# Patient Record
Sex: FEMALE | Race: WHITE | NOT HISPANIC OR LATINO | Employment: UNEMPLOYED | URBAN - METROPOLITAN AREA
[De-identification: names, ages, dates, MRNs, and addresses within clinical notes are randomized per-mention and may not be internally consistent; named-entity substitution may affect disease eponyms.]

---

## 2022-09-22 ENCOUNTER — OFFICE VISIT (OUTPATIENT)
Dept: FAMILY MEDICINE CLINIC | Facility: CLINIC | Age: 37
End: 2022-09-22
Payer: COMMERCIAL

## 2022-09-22 VITALS
SYSTOLIC BLOOD PRESSURE: 118 MMHG | HEART RATE: 91 BPM | WEIGHT: 142 LBS | DIASTOLIC BLOOD PRESSURE: 80 MMHG | WEIGHT: 142 LBS | HEART RATE: 91 BPM | SYSTOLIC BLOOD PRESSURE: 118 MMHG | DIASTOLIC BLOOD PRESSURE: 80 MMHG | BODY MASS INDEX: 24.24 KG/M2 | TEMPERATURE: 96.8 F | HEIGHT: 64 IN | BODY MASS INDEX: 24.24 KG/M2 | OXYGEN SATURATION: 98 % | OXYGEN SATURATION: 98 % | TEMPERATURE: 96.8 F | HEIGHT: 64 IN

## 2022-09-22 DIAGNOSIS — L65.9 HAIR LOSS: ICD-10-CM

## 2022-09-22 DIAGNOSIS — R51.9 CHRONIC INTRACTABLE HEADACHE, UNSPECIFIED HEADACHE TYPE: ICD-10-CM

## 2022-09-22 DIAGNOSIS — N91.2 AMENORRHEA: Primary | ICD-10-CM

## 2022-09-22 DIAGNOSIS — G89.29 CHRONIC INTRACTABLE HEADACHE, UNSPECIFIED HEADACHE TYPE: ICD-10-CM

## 2022-09-22 LAB
SL AMB POCT URINE HCG: NEGATIVE
SL AMB POCT URINE HCG: NEGATIVE

## 2022-09-22 PROCEDURE — 81025 URINE PREGNANCY TEST: CPT | Performed by: STUDENT IN AN ORGANIZED HEALTH CARE EDUCATION/TRAINING PROGRAM

## 2022-09-22 PROCEDURE — 99204 OFFICE O/P NEW MOD 45 MIN: CPT | Performed by: STUDENT IN AN ORGANIZED HEALTH CARE EDUCATION/TRAINING PROGRAM

## 2022-09-22 PROCEDURE — 3725F SCREEN DEPRESSION PERFORMED: CPT | Performed by: STUDENT IN AN ORGANIZED HEALTH CARE EDUCATION/TRAINING PROGRAM

## 2022-09-22 RX ORDER — AMITRIPTYLINE HYDROCHLORIDE 10 MG/1
10 TABLET, FILM COATED ORAL
Qty: 90 TABLET | Refills: 0 | Status: SHIPPED | OUTPATIENT
Start: 2022-09-22

## 2022-09-22 RX ORDER — NORETHINDRONE ACETATE AND ETHINYL ESTRADIOL 1MG-20(21)
1 KIT ORAL DAILY
Qty: 84 TABLET | Refills: 3 | Status: SHIPPED | OUTPATIENT
Start: 2022-09-22

## 2022-09-22 NOTE — PROGRESS NOTES
Assessment/Plan:         Problem List Items Addressed This Visit    None     Visit Diagnoses     Amenorrhea    -  Primary    Relevant Medications    norethindrone-ethinyl estradiol (Loestrin Fe 1/20) 1-20 MG-MCG per tablet    Other Relevant Orders    POCT urine HCG (Completed)    TSH, 3rd generation with Free T4 reflex    Comprehensive metabolic panel    CBC and differential    Magnesium    Vitamin D 25 hydroxy    Chronic intractable headache, unspecified headache type        Relevant Medications    amitriptyline (ELAVIL) 10 mg tablet    Other Relevant Orders    TSH, 3rd generation with Free T4 reflex    Comprehensive metabolic panel    CBC and differential    Magnesium    Vitamin D 25 hydroxy    Hair loss            Will start her on oral birth control to help with amenorrhea  Will treat with the TCA for the daily tension-type headaches  If no relief can try Topamax  Subjective:      Patient ID: Valente Babb is a 40 y o  female  HPI    1 sided headaches  Mostly R sided but can occur on the left side  Stress or burden of working/thinking can make it worse  Occurs daily  Uses advil and this offers moderate to significant relief  Started in college (15+ years ago)  They started to occur daily for the last few months  Having irregular periods since she was 13years old  Was on birth control  Without birth control she goes 3 months at a time  Also concerned about losing hear  Very stressed      The following portions of the patient's history were reviewed and updated as appropriate:   Past Medical History:  She has no past medical history on file ,  _______________________________________________________________________  Medical Problems:  does not have a problem list on file ,  _______________________________________________________________________  Past Surgical History:   has no past surgical history on file ,  _______________________________________________________________________  [facility] History:  family history includes Diabetes in her father and mother ,  _______________________________________________________________________  Social History:   reports that she has never smoked  She has never used smokeless tobacco  She reports that she does not drink alcohol and does not use drugs  ,  _______________________________________________________________________  Allergies:  is allergic to seasonal ic [cholestatin]     _______________________________________________________________________  Current Outpatient Medications   Medication Sig Dispense Refill    amitriptyline (ELAVIL) 10 mg tablet Take 1 tablet (10 mg total) by mouth daily at bedtime 90 tablet 0    norethindrone-ethinyl estradiol (Loestrin Fe 1/20) 1-20 MG-MCG per tablet Take 1 tablet by mouth daily 84 tablet 3     No current facility-administered medications for this visit      _______________________________________________________________________  Review of Systems   Constitutional: Negative for chills, fatigue and fever  HENT: Negative for rhinorrhea and sore throat  Eyes: Negative for visual disturbance  Respiratory: Negative for cough and shortness of breath  Cardiovascular: Negative for chest pain and palpitations  Gastrointestinal: Negative for abdominal pain, constipation, diarrhea, nausea and vomiting  Genitourinary: Positive for menstrual problem  Negative for difficulty urinating, dysuria and frequency  Musculoskeletal: Negative for arthralgias and myalgias  Skin: Negative for color change and rash  Neurological: Positive for headaches  Negative for weakness  Objective:  Vitals:    09/22/22 1102   BP: 118/80   Pulse: 91   Temp: (!) 96 8 °F (36 °C)   SpO2: 98%   Weight: 64 4 kg (142 lb)   Height: 5' 4" (1 626 m)     Body mass index is 24 37 kg/m²  Physical Exam  Constitutional:       General: She is not in acute distress  Appearance: She is not ill-appearing     HENT:      Head: Normocephalic and atraumatic  Right Ear: External ear normal       Left Ear: External ear normal       Nose: Nose normal  No congestion or rhinorrhea  Mouth/Throat:      Mouth: Mucous membranes are moist       Pharynx: Oropharynx is clear  No oropharyngeal exudate or posterior oropharyngeal erythema  Eyes:      Extraocular Movements: Extraocular movements intact  Conjunctiva/sclera: Conjunctivae normal       Pupils: Pupils are equal, round, and reactive to light  Cardiovascular:      Rate and Rhythm: Normal rate and regular rhythm  Pulses: Normal pulses  Heart sounds: No murmur heard  Pulmonary:      Effort: Pulmonary effort is normal  No respiratory distress  Breath sounds: Normal breath sounds  No wheezing  Chest:      Chest wall: No tenderness  Abdominal:      General: Bowel sounds are normal       Palpations: Abdomen is soft  Tenderness: There is no abdominal tenderness  Musculoskeletal:         General: Normal range of motion  Cervical back: Normal range of motion  Skin:     General: Skin is warm and dry  Capillary Refill: Capillary refill takes less than 2 seconds  Findings: No rash  Neurological:      General: No focal deficit present  Mental Status: She is alert  Mental status is at baseline

## 2022-10-12 ENCOUNTER — APPOINTMENT (OUTPATIENT)
Dept: LAB | Facility: CLINIC | Age: 37
End: 2022-10-12

## 2022-10-12 DIAGNOSIS — N91.2 AMENORRHEA: ICD-10-CM

## 2022-10-12 DIAGNOSIS — R51.9 CHRONIC INTRACTABLE HEADACHE, UNSPECIFIED HEADACHE TYPE: ICD-10-CM

## 2022-10-12 DIAGNOSIS — G89.29 CHRONIC INTRACTABLE HEADACHE, UNSPECIFIED HEADACHE TYPE: ICD-10-CM

## 2022-10-12 LAB
ALBUMIN SERPL BCP-MCNC: 3.8 G/DL (ref 3.5–5)
ALP SERPL-CCNC: 47 U/L (ref 46–116)
ALT SERPL W P-5'-P-CCNC: 29 U/L (ref 12–78)
ANION GAP SERPL CALCULATED.3IONS-SCNC: 3 MMOL/L (ref 4–13)
AST SERPL W P-5'-P-CCNC: 18 U/L (ref 5–45)
BASOPHILS # BLD AUTO: 0.02 THOUSANDS/ΜL (ref 0–0.1)
BASOPHILS NFR BLD AUTO: 0 % (ref 0–1)
BILIRUB SERPL-MCNC: 0.35 MG/DL (ref 0.2–1)
BUN SERPL-MCNC: 20 MG/DL (ref 5–25)
CALCIUM SERPL-MCNC: 9.2 MG/DL (ref 8.3–10.1)
CHLORIDE SERPL-SCNC: 105 MMOL/L (ref 96–108)
CO2 SERPL-SCNC: 27 MMOL/L (ref 21–32)
CREAT SERPL-MCNC: 0.75 MG/DL (ref 0.6–1.3)
EOSINOPHIL # BLD AUTO: 0.19 THOUSAND/ΜL (ref 0–0.61)
EOSINOPHIL NFR BLD AUTO: 4 % (ref 0–6)
ERYTHROCYTE [DISTWIDTH] IN BLOOD BY AUTOMATED COUNT: 12.5 % (ref 11.6–15.1)
GFR SERPL CREATININE-BSD FRML MDRD: 102 ML/MIN/1.73SQ M
GLUCOSE P FAST SERPL-MCNC: 101 MG/DL (ref 65–99)
HCT VFR BLD AUTO: 41.9 % (ref 34.8–46.1)
HGB BLD-MCNC: 13.2 G/DL (ref 11.5–15.4)
IMM GRANULOCYTES # BLD AUTO: 0 THOUSAND/UL (ref 0–0.2)
IMM GRANULOCYTES NFR BLD AUTO: 0 % (ref 0–2)
LYMPHOCYTES # BLD AUTO: 2.55 THOUSANDS/ΜL (ref 0.6–4.47)
LYMPHOCYTES NFR BLD AUTO: 49 % (ref 14–44)
MAGNESIUM SERPL-MCNC: 2.2 MG/DL (ref 1.6–2.6)
MCH RBC QN AUTO: 29.7 PG (ref 26.8–34.3)
MCHC RBC AUTO-ENTMCNC: 31.5 G/DL (ref 31.4–37.4)
MCV RBC AUTO: 94 FL (ref 82–98)
MONOCYTES # BLD AUTO: 0.33 THOUSAND/ΜL (ref 0.17–1.22)
MONOCYTES NFR BLD AUTO: 6 % (ref 4–12)
NEUTROPHILS # BLD AUTO: 2.11 THOUSANDS/ΜL (ref 1.85–7.62)
NEUTS SEG NFR BLD AUTO: 41 % (ref 43–75)
NRBC BLD AUTO-RTO: 0 /100 WBCS
PMV BLD AUTO: 11.7 FL (ref 8.9–12.7)
POTASSIUM SERPL-SCNC: 4.6 MMOL/L (ref 3.5–5.3)
PROT SERPL-MCNC: 8.5 G/DL (ref 6.4–8.4)
RBC # BLD AUTO: 4.44 MILLION/UL (ref 3.81–5.12)
SODIUM SERPL-SCNC: 135 MMOL/L (ref 135–147)
TSH SERPL DL<=0.05 MIU/L-ACNC: 1.26 UIU/ML (ref 0.45–4.5)
WBC # BLD AUTO: 5.2 THOUSAND/UL (ref 4.31–10.16)

## 2022-10-12 PROCEDURE — 80053 COMPREHEN METABOLIC PANEL: CPT

## 2022-10-12 PROCEDURE — 82306 VITAMIN D 25 HYDROXY: CPT

## 2022-10-12 PROCEDURE — 84443 ASSAY THYROID STIM HORMONE: CPT

## 2022-10-12 PROCEDURE — 83735 ASSAY OF MAGNESIUM: CPT

## 2022-10-12 PROCEDURE — 85025 COMPLETE CBC W/AUTO DIFF WBC: CPT

## 2022-10-12 PROCEDURE — 36415 COLL VENOUS BLD VENIPUNCTURE: CPT

## 2022-10-13 LAB — 25(OH)D3 SERPL-MCNC: 31.7 NG/ML (ref 30–100)

## 2022-11-16 ENCOUNTER — OFFICE VISIT (OUTPATIENT)
Dept: FAMILY MEDICINE CLINIC | Facility: CLINIC | Age: 37
End: 2022-11-16

## 2022-11-16 VITALS
WEIGHT: 159 LBS | HEART RATE: 71 BPM | SYSTOLIC BLOOD PRESSURE: 118 MMHG | TEMPERATURE: 98.1 F | HEIGHT: 64 IN | OXYGEN SATURATION: 96 % | BODY MASS INDEX: 27.14 KG/M2 | DIASTOLIC BLOOD PRESSURE: 76 MMHG

## 2022-11-16 DIAGNOSIS — R51.9 CHRONIC INTRACTABLE HEADACHE, UNSPECIFIED HEADACHE TYPE: ICD-10-CM

## 2022-11-16 DIAGNOSIS — Z00.00 ANNUAL PHYSICAL EXAM: ICD-10-CM

## 2022-11-16 DIAGNOSIS — G89.29 CHRONIC INTRACTABLE HEADACHE, UNSPECIFIED HEADACHE TYPE: ICD-10-CM

## 2022-11-16 DIAGNOSIS — Z23 INFLUENZA VACCINE ADMINISTERED: Primary | ICD-10-CM

## 2022-11-16 DIAGNOSIS — J30.2 SEASONAL ALLERGIES: ICD-10-CM

## 2022-11-16 DIAGNOSIS — N91.2 AMENORRHEA: ICD-10-CM

## 2022-11-16 RX ORDER — NORETHINDRONE ACETATE AND ETHINYL ESTRADIOL 1MG-20(21)
1 KIT ORAL DAILY
Qty: 84 TABLET | Refills: 3 | Status: SHIPPED | OUTPATIENT
Start: 2022-11-16

## 2022-11-16 RX ORDER — TOPIRAMATE 25 MG/1
25 TABLET ORAL 2 TIMES DAILY
Qty: 120 TABLET | Refills: 1 | Status: SHIPPED | OUTPATIENT
Start: 2022-11-16

## 2022-11-16 RX ORDER — CETIRIZINE HYDROCHLORIDE 10 MG/1
10 TABLET, CHEWABLE ORAL DAILY
Qty: 90 TABLET | Refills: 1 | Status: SHIPPED | OUTPATIENT
Start: 2022-11-16

## 2022-11-16 RX ORDER — FLUTICASONE PROPIONATE 50 MCG
1 SPRAY, SUSPENSION (ML) NASAL DAILY
Qty: 9.9 ML | Refills: 1 | Status: SHIPPED | OUTPATIENT
Start: 2022-11-16

## 2022-11-16 NOTE — PROGRESS NOTES
[facility] [address] [facility] [id number]    NAME: Mary Covarrubias  AGE: 40 y o  SEX: female  : 1985     DATE: 2022     Assessment and Plan:     Problem List Items Addressed This Visit    None  Visit Diagnoses     Influenza vaccine administered    -  Primary    Relevant Orders    influenza vaccine, quadrivalent, 0 5 mL, preservative-free, for adult and pediatric patients 6 mos+ (AFLURIA, FLUARIX, FLULAVAL, FLUZONE) (Completed)    Annual physical exam        BMI 27 0-27 9,adult        Seasonal allergies        Relevant Medications    fluticasone (FLONASE) 50 mcg/act nasal spray    cetirizine (ZyrTEC) 10 MG chewable tablet    Chronic intractable headache, unspecified headache type        Relevant Medications    topiramate (Topamax) 25 mg tablet    Amenorrhea        Relevant Medications    norethindrone-ethinyl estradiol (Loestrin Fe ) 1-20 MG-MCG per tablet        If patient and her spouse decide to attempt to conceive a pregnancy when he returns in January, recommend in December after her period to stop her OCP  Also recommended against using Topamax if trying to conceive or if she becomes pregnanct  She will call with any questions     Immunizations and preventive care screenings were discussed with patient today  Appropriate education was printed on patient's after visit summary  Counseling:  Exercise: the importance of regular exercise/physical activity was discussed  Recommend exercise 3-5 times per week for at least 30 minutes  BMI Counseling: Body mass index is 27 29 kg/m²  The BMI is above normal  Nutrition recommendations include decreasing portion sizes, encouraging healthy choices of fruits and vegetables, decreasing fast food intake, consuming healthier snacks, limiting drinks that contain sugar and moderation in carbohydrate intake   Exercise recommendations include moderate physical activity 150 minutes/week, exercising 3-5 times per week and strength training exercises  No pharmacotherapy was ordered  Rationale for BMI follow-up plan is due to patient being overweight or obese  Depression Screening and Follow-up Plan: Patient was screened for depression during today's encounter  They screened negative with a PHQ-2 score of 0  Return in 2 months (on 1/16/2023) for Recheck  Chief Complaint:     Chief Complaint   Patient presents with   • Physical Exam     Pt wants to discuss allergies  Pt states she experiences earache, watery eyes  • Migraine   • Flu Vaccine      History of Present Illness:     Adult Annual Physical   Patient here for a comprehensive physical exam  The patient reports problems - HA'snot improving with TCA  also seasonal allergies  Diet and Physical Activity  Diet/Nutrition: well balanced diet  Exercise: no formal exercise  Depression Screening  PHQ-2/9 Depression Screening    Little interest or pleasure in doing things: 0 - not at all  Feeling down, depressed, or hopeless: 0 - not at all  PHQ-2 Score: 0  PHQ-2 Interpretation: Negative depression screen       General Health  Sleep: sleeps well  Hearing: normal - none   Vision: no vision problems  Dental: regular dental visits  /GYN Health  Last menstrual period: current  Contraceptive method: oral contraceptives, not active  History of STDs?: no      Review of Systems:     Review of Systems   Constitutional: Negative for chills, fatigue and fever  HENT: Negative for rhinorrhea and sore throat  Eyes: Negative for visual disturbance  Respiratory: Negative for cough and shortness of breath  Cardiovascular: Negative for chest pain and palpitations  Gastrointestinal: Negative for abdominal pain, constipation, diarrhea, nausea and vomiting  Genitourinary: Negative for difficulty urinating, dysuria and frequency  Musculoskeletal: Negative for arthralgias and myalgias     Skin: Negative for color change and rash  Neurological: Negative for weakness and headaches  Past Medical History:     History reviewed  No pertinent past medical history  Past Surgical History:     History reviewed  No pertinent surgical history  Social History:     Social History     Socioeconomic History   • Marital status: /Civil Union     Spouse name: None   • Number of children: None   • Years of education: None   • Highest education level: None   Occupational History   • None   Tobacco Use   • Smoking status: Never   • Smokeless tobacco: Never   Vaping Use   • Vaping Use: Never used   Substance and Sexual Activity   • Alcohol use: Never   • Drug use: Never   • Sexual activity: Not Currently   Other Topics Concern   • None   Social History Narrative   • None     Social Determinants of Health     Financial Resource Strain: Not on file   Food Insecurity: Not on file   Transportation Needs: Not on file   Physical Activity: Not on file   Stress: Not on file   Social Connections: Not on file   Intimate Partner Violence: Not on file   Housing Stability: Not on file      Family History:     Family History   Problem Relation Age of Onset   • Diabetes Mother    • Diabetes Father       Current Medications:     Current Outpatient Medications   Medication Sig Dispense Refill   • cetirizine (ZyrTEC) 10 MG chewable tablet Chew 1 tablet (10 mg total) daily 90 tablet 1   • fluticasone (FLONASE) 50 mcg/act nasal spray 1 spray into each nostril daily 9 9 mL 1   • norethindrone-ethinyl estradiol (Loestrin Fe 1/20) 1-20 MG-MCG per tablet Take 1 tablet by mouth daily 84 tablet 3   • topiramate (Topamax) 25 mg tablet Take 1 tablet (25 mg total) by mouth 2 (two) times a day 120 tablet 1     No current facility-administered medications for this visit  Allergies: Allergies   Allergen Reactions   • Seasonal Ic [Cholestatin] Itching     Eyes, nose ,and throat        Physical Exam:     /76 (BP Location: Right arm, Patient Position: Sitting, Cuff Size: Standard)   Pulse 71   Temp 98 1 °F (36 7 °C)   Ht 5' 4" (1 626 m)   Wt 72 1 kg (159 lb)   SpO2 96%   BMI 27 29 kg/m²     Physical Exam  Constitutional:       General: She is not in acute distress  Appearance: Normal appearance  She is not ill-appearing  HENT:      Head: Normocephalic and atraumatic  Right Ear: Tympanic membrane, ear canal and external ear normal       Left Ear: Tympanic membrane, ear canal and external ear normal       Nose: Nose normal       Mouth/Throat:      Mouth: Mucous membranes are moist       Pharynx: Oropharynx is clear  No oropharyngeal exudate or posterior oropharyngeal erythema  Eyes:      General: No scleral icterus  Right eye: No discharge  Left eye: No discharge  Extraocular Movements: Extraocular movements intact  Conjunctiva/sclera: Conjunctivae normal       Pupils: Pupils are equal, round, and reactive to light  Cardiovascular:      Rate and Rhythm: Normal rate and regular rhythm  Pulses: Normal pulses  Heart sounds: Normal heart sounds  No murmur heard  Pulmonary:      Effort: Pulmonary effort is normal  No respiratory distress  Breath sounds: Normal breath sounds  Abdominal:      General: Bowel sounds are normal       Palpations: Abdomen is soft  Tenderness: There is no abdominal tenderness  Musculoskeletal:         General: Normal range of motion  Cervical back: Normal range of motion and neck supple  Lymphadenopathy:      Cervical: No cervical adenopathy  Skin:     General: Skin is warm and dry  Capillary Refill: Capillary refill takes less than 2 seconds  Neurological:      General: No focal deficit present  Mental Status: She is alert and oriented to person, place, and time  Mental status is at baseline  Cranial Nerves: No cranial nerve deficit     Psychiatric:         Mood and Affect: Mood normal           Cande Richards MD   [address] [place] [address] PRACTICE [address]  BMI Counseling: Body mass index is 27 29 kg/m²  The BMI is above normal  Nutrition recommendations include reducing portion sizes, reducing fast food intake, decreasing soda and/or juice intake, increasing intake of lean protein and reducing intake of saturated fat and trans fat  Exercise recommendations include moderate aerobic physical activity for 150 minutes/week and exercising 3-5 times per week

## 2022-11-16 NOTE — PATIENT INSTRUCTIONS
Wellness Visit for Adults   AMBULATORY CARE:   A wellness visit  is when you see your healthcare provider to get screened for health problems  Your healthcare provider will also give you advice on how to stay healthy  Write down your questions so you remember to ask them  Ask your healthcare provider how often you should have a wellness visit  What happens at a wellness visit:  Your healthcare provider will ask about your health, and your family history of health problems  This includes high blood pressure, heart disease, and cancer  He or she will ask if you have symptoms that concern you, if you smoke, and about your mood  You may also be asked about your intake of medicines, supplements, food, and alcohol  Any of the following may be done: Your weight  will be checked  Your height may also be checked so your body mass index (BMI) can be calculated  Your BMI shows if you are at a healthy weight  Your blood pressure  and heart rate will be checked  Your temperature may also be checked  Blood and urine tests  may be done  Blood tests may be done to check your cholesterol levels  Abnormal cholesterol levels increase your risk for heart disease and stroke  You may also need a blood or urine test to check for diabetes if you are at increased risk  Urine tests may be done to look for signs of an infection or kidney disease  A physical exam  includes checking your heartbeat and lungs with a stethoscope  Your healthcare provider may also check your skin to look for sun damage  Screening tests  may be recommended  A screening test is done to check for diseases that may not cause symptoms  The screening tests you may need depend on your age, gender, family history, and lifestyle habits  For example, colorectal screening may be recommended if you are 48years old or older  Screening tests you need if you are a woman:   A Pap smear  is used to screen for cervical cancer   Pap smears are usually done every 3 to 5 years depending on your age  You may need them more often if you have had abnormal Pap smear test results in the past  Ask your healthcare provider how often you should have a Pap smear  A mammogram  is an x-ray of your breasts to screen for breast cancer  Experts recommend mammograms every 2 years starting at age 48 years  You may need a mammogram at age 52 years or younger if you have an increased risk for breast cancer  Talk to your healthcare provider about when you should start having mammograms and how often you need them  Vaccines you may need:   Get an influenza vaccine  every year  The influenza vaccine protects you from the flu  Several types of viruses cause the flu  The viruses change over time, so new vaccines are made each year  Get a tetanus-diphtheria (Td) booster vaccine  every 10 years  This vaccine protects you against tetanus and diphtheria  Tetanus is a severe infection that may cause painful muscle spasms and lockjaw  Diphtheria is a severe bacterial infection that causes a thick covering in the back of your mouth and throat  Get a human papillomavirus (HPV) vaccine  if you are female and aged 23 to 32 or male 23 to 24 and never received it  This vaccine protects you from HPV infection  HPV is the most common infection spread by sexual contact  HPV may also cause vaginal, penile, and anal cancers  Get a pneumococcal vaccine  if you are aged 72 years or older  The pneumococcal vaccine is an injection given to protect you from pneumococcal disease  Pneumococcal disease is an infection caused by pneumococcal bacteria  The infection may cause pneumonia, meningitis, or an ear infection  Get a shingles vaccine  if you are 60 or older, even if you have had shingles before  The shingles vaccine is an injection to protect you from the varicella-zoster virus  This is the same virus that causes chickenpox   Shingles is a painful rash that develops in people who had chickenpox or have been exposed to the virus  How to eat healthy:  My Plate is a model for planning healthy meals  It shows the types and amounts of foods that should go on your plate  Fruits and vegetables make up about half of your plate, and grains and protein make up the other half  A serving of dairy is included on the side of your plate  The amount of calories and serving sizes you need depends on your age, gender, weight, and height  Examples of healthy foods are listed below:  Eat a variety of vegetables  such as dark green, red, and orange vegetables  You can also include canned vegetables low in sodium (salt) and frozen vegetables without added butter or sauces  Eat a variety of fresh fruits , canned fruit in 100% juice, frozen fruit, and dried fruit  Include whole grains  At least half of the grains you eat should be whole grains  Examples include whole-wheat bread, wheat pasta, brown rice, and whole-grain cereals such as oatmeal     Eat a variety of protein foods such as seafood (fish and shellfish), lean meat, and poultry without skin (turkey and chicken)  Examples of lean meats include pork leg, shoulder, or tenderloin, and beef round, sirloin, tenderloin, and extra lean ground beef  Other protein foods include eggs and egg substitutes, beans, peas, soy products, nuts, and seeds  Choose low-fat dairy products such as skim or 1% milk or low-fat yogurt, cheese, and cottage cheese  Limit unhealthy fats  such as butter, hard margarine, and shortening  Exercise:  Exercise at least 30 minutes per day on most days of the week  Some examples of exercise include walking, biking, dancing, and swimming  You can also fit in more physical activity by taking the stairs instead of the elevator or parking farther away from stores  Include muscle strengthening activities 2 days each week  Regular exercise provides many health benefits   It helps you manage your weight, and decreases your risk for type 2 diabetes, heart disease, stroke, and high blood pressure  Exercise can also help improve your mood  Ask your healthcare provider about the best exercise plan for you  General health and safety guidelines:   Do not smoke  Nicotine and other chemicals in cigarettes and cigars can cause lung damage  Ask your healthcare provider for information if you currently smoke and need help to quit  E-cigarettes or smokeless tobacco still contain nicotine  Talk to your healthcare provider before you use these products  Limit alcohol  A drink of alcohol is 12 ounces of beer, 5 ounces of wine, or 1½ ounces of liquor  Lose weight, if needed  Being overweight increases your risk of certain health conditions  These include heart disease, high blood pressure, type 2 diabetes, and certain types of cancer  Protect your skin  Do not sunbathe or use tanning beds  Use sunscreen with a SPF 15 or higher  Apply sunscreen at least 15 minutes before you go outside  Reapply sunscreen every 2 hours  Wear protective clothing, hats, and sunglasses when you are outside  Drive safely  Always wear your seatbelt  Make sure everyone in your car wears a seatbelt  A seatbelt can save your life if you are in an accident  Do not use your cell phone when you are driving  This could distract you and cause an accident  Pull over if you need to make a call or send a text message  Practice safe sex  Use latex condoms if are sexually active and have more than one partner  Your healthcare provider may recommend screening tests for sexually transmitted infections (STIs)  Wear helmets, lifejackets, and protective gear  Always wear a helmet when you ride a bike or motorcycle, go skiing, or play sports that could cause a head injury  Wear protective equipment when you play sports  Wear a lifejacket when you are on a boat or doing water sports      © Copyright [employer] 2022 Information is for End User's use only and may not be sold, redistributed or otherwise used for commercial purposes  All illustrations and images included in CareNotes® are the copyrighted property of A [employer] A M [employer]  or [employer]  The above information is an  only  It is not intended as medical advice for individual conditions or treatments  Talk to your doctor, nurse or pharmacist before following any medical regimen to see if it is safe and effective for you

## 2022-11-18 ENCOUNTER — TELEPHONE (OUTPATIENT)
Dept: FAMILY MEDICINE CLINIC | Facility: CLINIC | Age: 37
End: 2022-11-18

## 2022-11-18 NOTE — TELEPHONE ENCOUNTER
Incoming fax notification via [employer] that drug cetirizine (ZyrTEC) 10 MG chewable tablet  requires prior auth       KEY CODE : [id number]    Proceed with PA?      Please advise

## 2022-11-18 NOTE — TELEPHONE ENCOUNTER
Your PA has been faxed to the plan as a paper copy  Please contact the plan directly if you haven't received a determination in a typical timeframe  You will be notified of the determination via fax

## 2022-11-21 NOTE — TELEPHONE ENCOUNTER
2nd attempt to reach pt- left a detailed vm for pt to pick it up otc  Any questions or concerns pt will call

## 2023-01-24 ENCOUNTER — OFFICE VISIT (OUTPATIENT)
Dept: FAMILY MEDICINE CLINIC | Facility: CLINIC | Age: 38
End: 2023-01-24

## 2023-01-24 VITALS
BODY MASS INDEX: 26.29 KG/M2 | DIASTOLIC BLOOD PRESSURE: 78 MMHG | SYSTOLIC BLOOD PRESSURE: 122 MMHG | TEMPERATURE: 97.6 F | HEIGHT: 64 IN | WEIGHT: 154 LBS | OXYGEN SATURATION: 97 % | HEART RATE: 87 BPM

## 2023-01-24 DIAGNOSIS — Z31.9 DESIRE FOR PREGNANCY: ICD-10-CM

## 2023-01-24 DIAGNOSIS — K21.9 GASTROESOPHAGEAL REFLUX DISEASE WITHOUT ESOPHAGITIS: ICD-10-CM

## 2023-01-24 DIAGNOSIS — K59.00 CONSTIPATION, UNSPECIFIED CONSTIPATION TYPE: ICD-10-CM

## 2023-01-24 DIAGNOSIS — N92.6 IRREGULAR PERIODS: Primary | ICD-10-CM

## 2023-01-24 RX ORDER — CALCIUM CARBONATE 200(500)MG
1 TABLET,CHEWABLE ORAL 2 TIMES DAILY PRN
Qty: 60 TABLET | Refills: 1 | Status: SHIPPED | OUTPATIENT
Start: 2023-01-24

## 2023-01-24 RX ORDER — POLYETHYLENE GLYCOL 3350 17 G/17G
17 POWDER, FOR SOLUTION ORAL DAILY
Qty: 507 G | Refills: 0 | Status: SHIPPED | OUTPATIENT
Start: 2023-01-24

## 2023-01-24 RX ORDER — CHOLECALCIFEROL (VITAMIN D3) 25 MCG
1 TABLET,CHEWABLE ORAL DAILY
Qty: 90 CAPSULE | Refills: 1 | Status: SHIPPED | OUTPATIENT
Start: 2023-01-24

## 2023-01-24 NOTE — PROGRESS NOTES
Assessment/Plan:         Problem List Items Addressed This Visit    None  Visit Diagnoses     Irregular periods    -  Primary    Relevant Orders    Ambulatory Referral to Gynecology    Gastroesophageal reflux disease without esophagitis        Relevant Medications    calcium carbonate (TUMS) 500 mg chewable tablet    Constipation, unspecified constipation type        Relevant Medications    polyethylene glycol (GLYCOLAX) 17 GM/SCOOP powder    Desire for pregnancy        Relevant Medications    Prenatal Vit-Fe Fum-FA-Omega (Prenatal Multi +DHA) 27-0 8-228 MG CAPS        Will have her see GYN as she is going to start to try and concieve when her  is home next month  Prenatal vitamin sent in  abd pain consistenmt with acid reflux, recommend prn tums  Also recommend miralaax daily to help get more regular  Subjective:      Patient ID: Iris Martell is a 41 y o  female  HPI     Patient coming in for follow-up  Reports she is still having irregular periods and has stopped taking the birth control as it was providing some mild relief but she was getting spotting off and on  She is concerned about this as her  is returning next month and they are trying to conceive a child  Has been dealing with epigastric belly pain that is burning in nature  Sometimes radiates towards her back  She has noticed that sometimes meals exacerbated and walking and belching help alleviate the pain  Does not use any NSAIDs and does not drink alcohol  Has also been dealing with constipation  She drinks plenty of water and has a diet higher in vegetables and fruits and states she still has difficulty going to the bathroom  Has 1 bowel movement a day is usually painful has to strain heavily      The following portions of the patient's history were reviewed and updated as appropriate:   Past Medical History:  She has no past medical history on file ,  _______________________________________________________________________  Medical Problems:  does not have a problem list on file ,  _______________________________________________________________________  Past Surgical History:   has no past surgical history on file ,  _______________________________________________________________________  Family History:  family history includes Diabetes in her father and mother ,  _______________________________________________________________________  Social History:   reports that she has never smoked  She has never used smokeless tobacco  She reports that she does not drink alcohol and does not use drugs  ,  _______________________________________________________________________  Allergies:  is allergic to seasonal ic [cholestatin]     _______________________________________________________________________  Current Outpatient Medications   Medication Sig Dispense Refill   • calcium carbonate (TUMS) 500 mg chewable tablet Chew 1 tablet (500 mg total) 2 (two) times a day as needed for indigestion or heartburn 60 tablet 1   • cetirizine (ZyrTEC) 10 MG chewable tablet Chew 1 tablet (10 mg total) daily 90 tablet 1   • fluticasone (FLONASE) 50 mcg/act nasal spray 1 spray into each nostril daily 9 9 mL 1   • polyethylene glycol (GLYCOLAX) 17 GM/SCOOP powder Take 17 g by mouth daily 507 g 0   • Prenatal Vit-Fe Fum-FA-Omega (Prenatal Multi +DHA) 27-0 8-228 MG CAPS Take 1 capsule by mouth in the morning 90 capsule 1   • topiramate (Topamax) 25 mg tablet Take 1 tablet (25 mg total) by mouth 2 (two) times a day 120 tablet 1     No current facility-administered medications for this visit      _______________________________________________________________________  Review of Systems   Constitutional: Negative for activity change, appetite change, fatigue and fever  HENT: Negative for congestion, rhinorrhea and sore throat  Eyes: Negative for visual disturbance     Respiratory: Negative for cough and shortness of breath  Cardiovascular: Negative for chest pain  Gastrointestinal: Positive for abdominal pain and constipation  Negative for nausea and vomiting  Genitourinary: Positive for menstrual problem  Objective:  Vitals:    01/24/23 1559   BP: 122/78   BP Location: Left arm   Patient Position: Sitting   Cuff Size: Standard   Pulse: 87   Temp: 97 6 °F (36 4 °C)   SpO2: 97%   Weight: 69 9 kg (154 lb)   Height: 5' 4" (1 626 m)     Body mass index is 26 43 kg/m²  Physical Exam  Constitutional:       General: She is not in acute distress  Appearance: She is not ill-appearing  HENT:      Head: Normocephalic and atraumatic  Right Ear: External ear normal       Left Ear: External ear normal       Nose: Nose normal  No congestion or rhinorrhea  Mouth/Throat:      Mouth: Mucous membranes are moist       Pharynx: Oropharynx is clear  No oropharyngeal exudate or posterior oropharyngeal erythema  Eyes:      Extraocular Movements: Extraocular movements intact  Conjunctiva/sclera: Conjunctivae normal       Pupils: Pupils are equal, round, and reactive to light  Cardiovascular:      Rate and Rhythm: Normal rate and regular rhythm  Pulses: Normal pulses  Heart sounds: No murmur heard  Pulmonary:      Effort: Pulmonary effort is normal  No respiratory distress  Breath sounds: Normal breath sounds  No wheezing  Chest:      Chest wall: No tenderness  Abdominal:      General: Bowel sounds are normal       Palpations: Abdomen is soft  Tenderness: There is no abdominal tenderness  Musculoskeletal:         General: Normal range of motion  Cervical back: Normal range of motion  Skin:     General: Skin is warm and dry  Capillary Refill: Capillary refill takes less than 2 seconds  Findings: No rash  Neurological:      General: No focal deficit present  Mental Status: She is alert  Mental status is at baseline

## 2023-02-07 ENCOUNTER — TELEPHONE (OUTPATIENT)
Dept: FAMILY MEDICINE CLINIC | Facility: CLINIC | Age: 38
End: 2023-02-07

## 2023-02-07 NOTE — TELEPHONE ENCOUNTER
Pt has cold and would like to be seen with her son - informed pt she cannot be seen at the same time - asked me to ask dr Samuel Rodriguez if she can be seen at [facility] appt

## 2023-02-07 NOTE — TELEPHONE ENCOUNTER
We can schedule her, double book whenever her child's appointment is   Please ask them to come 20-25 minutes early so we may accomodates

## 2023-02-08 ENCOUNTER — OFFICE VISIT (OUTPATIENT)
Dept: FAMILY MEDICINE CLINIC | Facility: CLINIC | Age: 38
End: 2023-02-08

## 2023-02-08 VITALS
OXYGEN SATURATION: 97 % | WEIGHT: 154 LBS | HEART RATE: 75 BPM | HEIGHT: 64 IN | DIASTOLIC BLOOD PRESSURE: 74 MMHG | SYSTOLIC BLOOD PRESSURE: 120 MMHG | TEMPERATURE: 97.8 F | BODY MASS INDEX: 26.29 KG/M2

## 2023-02-08 DIAGNOSIS — J06.9 UPPER RESPIRATORY TRACT INFECTION, UNSPECIFIED TYPE: Primary | ICD-10-CM

## 2023-02-08 NOTE — PROGRESS NOTES
Assessment/Plan:         Problem List Items Addressed This Visit    None  Visit Diagnoses     Upper respiratory tract infection, unspecified type    -  Primary    Relevant Orders    Covid/Flu- Office Collect            Subjective:      Patient ID: Maxi Mills is a 41 y o  female  HPI    Uri symptoms, fatigue  Rhinorrhea  1 week ago started, has overall improved  Took tylenol with relief  Negative for sore throat now, initially was present  Today feels very tired and chills, other symptoms overall resolved    The following portions of the patient's history were reviewed and updated as appropriate:   Past Medical History:  She has no past medical history on file ,  _______________________________________________________________________  Medical Problems:  does not have a problem list on file ,  _______________________________________________________________________  Past Surgical History:   has no past surgical history on file ,  _______________________________________________________________________  Family History:  family history includes Diabetes in her father and mother ,  _______________________________________________________________________  Social History:   reports that she has never smoked  She has never used smokeless tobacco  She reports that she does not drink alcohol and does not use drugs  ,  _______________________________________________________________________  Allergies:  is allergic to seasonal ic [cholestatin]     _______________________________________________________________________  Current Outpatient Medications   Medication Sig Dispense Refill   • calcium carbonate (TUMS) 500 mg chewable tablet Chew 1 tablet (500 mg total) 2 (two) times a day as needed for indigestion or heartburn 60 tablet 1   • cetirizine (ZyrTEC) 10 MG chewable tablet Chew 1 tablet (10 mg total) daily 90 tablet 1   • fluticasone (FLONASE) 50 mcg/act nasal spray 1 spray into each nostril daily 9 9 mL 1   • polyethylene glycol (GLYCOLAX) 17 GM/SCOOP powder Take 17 g by mouth daily 507 g 0   • Prenatal Vit-Fe Fum-FA-Omega (Prenatal Multi +DHA) 27-0 8-228 MG CAPS Take 1 capsule by mouth in the morning 90 capsule 1   • topiramate (Topamax) 25 mg tablet Take 1 tablet (25 mg total) by mouth 2 (two) times a day 120 tablet 1     No current facility-administered medications for this visit      _______________________________________________________________________  Review of Systems   Constitutional: Positive for chills and fatigue  Negative for fever  HENT: Positive for congestion (improving), rhinorrhea (improving), sinus pressure (improving), sinus pain (improving) and sore throat (improving)  Respiratory: Negative for cough  Musculoskeletal: Positive for arthralgias and myalgias  Objective:  Vitals:    02/08/23 0832   BP: 120/74   BP Location: Left arm   Patient Position: Sitting   Cuff Size: Standard   Pulse: 75   Temp: 97 8 °F (36 6 °C)   SpO2: 97%   Weight: 69 9 kg (154 lb)   Height: 5' 4" (1 626 m)     Body mass index is 26 43 kg/m²  Physical Exam  Constitutional:       General: She is not in acute distress  Appearance: She is not ill-appearing  HENT:      Head: Normocephalic and atraumatic  Right Ear: External ear normal       Left Ear: External ear normal       Nose: Nose normal  No congestion or rhinorrhea  Mouth/Throat:      Mouth: Mucous membranes are moist       Pharynx: Oropharynx is clear  No oropharyngeal exudate or posterior oropharyngeal erythema  Eyes:      Extraocular Movements: Extraocular movements intact  Conjunctiva/sclera: Conjunctivae normal       Pupils: Pupils are equal, round, and reactive to light  Cardiovascular:      Rate and Rhythm: Normal rate and regular rhythm  Pulses: Normal pulses  Heart sounds: No murmur heard  Pulmonary:      Effort: Pulmonary effort is normal  No respiratory distress  Breath sounds: Normal breath sounds   No wheezing  Chest:      Chest wall: No tenderness  Abdominal:      General: Bowel sounds are normal       Palpations: Abdomen is soft  Tenderness: There is no abdominal tenderness  Musculoskeletal:         General: Normal range of motion  Cervical back: Normal range of motion  Skin:     General: Skin is warm and dry  Capillary Refill: Capillary refill takes less than 2 seconds  Findings: No rash  Neurological:      General: No focal deficit present  Mental Status: She is alert  Mental status is at baseline

## 2023-02-09 LAB
FLUAV RNA RESP QL NAA+PROBE: NEGATIVE
FLUBV RNA RESP QL NAA+PROBE: NEGATIVE
SARS-COV-2 RNA RESP QL NAA+PROBE: NEGATIVE

## 2023-03-22 DIAGNOSIS — J30.2 SEASONAL ALLERGIES: ICD-10-CM

## 2023-03-22 RX ORDER — FLUTICASONE PROPIONATE 50 MCG
SPRAY, SUSPENSION (ML) NASAL
Qty: 16 ML | Refills: 1 | Status: SHIPPED | OUTPATIENT
Start: 2023-03-22

## 2023-08-14 ENCOUNTER — OFFICE VISIT (OUTPATIENT)
Dept: FAMILY MEDICINE CLINIC | Facility: CLINIC | Age: 38
End: 2023-08-14
Payer: COMMERCIAL

## 2023-08-14 VITALS
SYSTOLIC BLOOD PRESSURE: 124 MMHG | BODY MASS INDEX: 26.8 KG/M2 | DIASTOLIC BLOOD PRESSURE: 70 MMHG | HEIGHT: 64 IN | HEART RATE: 65 BPM | WEIGHT: 157 LBS | TEMPERATURE: 97.1 F | OXYGEN SATURATION: 98 %

## 2023-08-14 DIAGNOSIS — R51.9 CHRONIC INTRACTABLE HEADACHE, UNSPECIFIED HEADACHE TYPE: ICD-10-CM

## 2023-08-14 DIAGNOSIS — J30.2 SEASONAL ALLERGIES: ICD-10-CM

## 2023-08-14 DIAGNOSIS — K59.00 CONSTIPATION, UNSPECIFIED CONSTIPATION TYPE: ICD-10-CM

## 2023-08-14 DIAGNOSIS — G89.29 CHRONIC INTRACTABLE HEADACHE, UNSPECIFIED HEADACHE TYPE: ICD-10-CM

## 2023-08-14 PROCEDURE — 99214 OFFICE O/P EST MOD 30 MIN: CPT | Performed by: STUDENT IN AN ORGANIZED HEALTH CARE EDUCATION/TRAINING PROGRAM

## 2023-08-14 RX ORDER — POLYETHYLENE GLYCOL 3350 17 G/17G
17 POWDER, FOR SOLUTION ORAL DAILY
Qty: 507 G | Refills: 0 | Status: SHIPPED | OUTPATIENT
Start: 2023-08-14

## 2023-08-14 RX ORDER — LORATADINE 10 MG/1
10 TABLET ORAL DAILY
Qty: 90 TABLET | Refills: 1 | Status: SHIPPED | OUTPATIENT
Start: 2023-08-14

## 2023-08-14 RX ORDER — TOPIRAMATE 25 MG/1
25 TABLET ORAL 2 TIMES DAILY
Qty: 120 TABLET | Refills: 1 | Status: CANCELLED | OUTPATIENT
Start: 2023-08-14

## 2023-08-14 RX ORDER — AMITRIPTYLINE HYDROCHLORIDE 25 MG/1
25 TABLET, FILM COATED ORAL
Qty: 90 TABLET | Refills: 0 | Status: SHIPPED | OUTPATIENT
Start: 2023-08-14

## 2023-08-14 RX ORDER — FLUTICASONE PROPIONATE 50 MCG
2 SPRAY, SUSPENSION (ML) NASAL DAILY
Qty: 16 ML | Refills: 1 | Status: SHIPPED | OUTPATIENT
Start: 2023-08-14

## 2023-08-14 NOTE — PROGRESS NOTES
Assessment/Plan:         Problem List Items Addressed This Visit    None  Visit Diagnoses     Chronic intractable headache, unspecified headache type        Relevant Medications    amitriptyline (ELAVIL) 25 mg tablet    Constipation, unspecified constipation type        Relevant Medications    polyethylene glycol (GLYCOLAX) 17 GM/SCOOP powder    Seasonal allergies        Relevant Medications    fluticasone (FLONASE) 50 mcg/act nasal spray    loratadine (CLARITIN) 10 mg tablet        Switch from Topamax to amitriptyline and have follow-up    Subjective:      Patient ID: Rupa Lorenzo is a 41 y.o. female. HPI     Headaches improved on topamax, felt bvery sleep on topamax    Also itching arouind eyes, in mouth. Dealing with allergies. States Zyrtec before is not as helpful. Needs refill on MiraLAX for constipation    The following portions of the patient's history were reviewed and updated as appropriate:   Past Medical History:  She has no past medical history on file.,  _______________________________________________________________________  Medical Problems:  does not have a problem list on file.,  _______________________________________________________________________  Past Surgical History:   has no past surgical history on file.,  _______________________________________________________________________  Family History:  family history includes Diabetes in her father and mother.,  _______________________________________________________________________  Social History:   reports that she has never smoked. She has never used smokeless tobacco. She reports that she does not drink alcohol and does not use drugs. ,  _______________________________________________________________________  Allergies:  is allergic to seasonal ic [cholestatin]. .  _______________________________________________________________________  Current Outpatient Medications   Medication Sig Dispense Refill   • amitriptyline (ELAVIL) 25 mg tablet Take 1 tablet (25 mg total) by mouth daily at bedtime 90 tablet 0   • calcium carbonate (TUMS) 500 mg chewable tablet Chew 1 tablet (500 mg total) 2 (two) times a day as needed for indigestion or heartburn 60 tablet 1   • fluticasone (FLONASE) 50 mcg/act nasal spray 2 sprays into each nostril daily 16 mL 1   • loratadine (CLARITIN) 10 mg tablet Take 1 tablet (10 mg total) by mouth daily 90 tablet 1   • polyethylene glycol (GLYCOLAX) 17 GM/SCOOP powder Take 17 g by mouth daily 507 g 0   • Prenatal Vit-Fe Fum-FA-Omega (Prenatal Multi +DHA) 27-0.8-228 MG CAPS Take 1 capsule by mouth in the morning 90 capsule 1     No current facility-administered medications for this visit.     _______________________________________________________________________  Review of Systems   Constitutional: Negative for activity change, appetite change, fatigue and fever. HENT: Negative for congestion, rhinorrhea and sore throat. Eyes: Negative for visual disturbance. Respiratory: Negative for cough and shortness of breath. Cardiovascular: Negative for chest pain. Gastrointestinal: Positive for constipation. Negative for nausea and vomiting. Neurological: Positive for headaches. Objective:  Vitals:    08/14/23 1007   BP: 124/70   BP Location: Left arm   Patient Position: Sitting   Cuff Size: Standard   Pulse: 65   Temp: (!) 97.1 °F (36.2 °C)   SpO2: 98%   Weight: 71.2 kg (157 lb)   Height: 5' 4" (1.626 m)     Body mass index is 26.95 kg/m². Physical Exam  Constitutional:       General: She is not in acute distress. Appearance: She is well-developed. She is not ill-appearing. HENT:      Head: Normocephalic and atraumatic. Pulmonary:      Effort: Pulmonary effort is normal.   Neurological:      General: No focal deficit present. Mental Status: She is alert.

## 2023-10-30 ENCOUNTER — CLINICAL SUPPORT (OUTPATIENT)
Dept: FAMILY MEDICINE CLINIC | Facility: CLINIC | Age: 38
End: 2023-10-30
Payer: COMMERCIAL

## 2023-10-30 ENCOUNTER — TELEPHONE (OUTPATIENT)
Dept: FAMILY MEDICINE CLINIC | Facility: CLINIC | Age: 38
End: 2023-10-30

## 2023-10-30 DIAGNOSIS — Z23 ENCOUNTER FOR IMMUNIZATION: Primary | ICD-10-CM

## 2023-10-30 PROCEDURE — 90746 HEPB VACCINE 3 DOSE ADULT IM: CPT

## 2023-10-30 PROCEDURE — 90471 IMMUNIZATION ADMIN: CPT

## 2023-10-30 NOTE — TELEPHONE ENCOUNTER
Returned message - spoke w pt - needs HEP B SHOT for immigration ppwrk / forms - will bring forms with - pt went to an u/c and clinic in [place] ([place] unable to get this vaccine. Pt did get her flu shot for 2023 at local [facility]. Pt scheduled a NV for this afternoon and also scheduled PE with you on 11/22/2023 @2:05 PM (due for on 11/16/2023). Pt reminded to bring her complete immunization hx with. Please advise       Pt left a VM re: vaccines  Transcribed VM:  Hello, my name is Dawit. I'm patient of Doctor Ryan Hunter calling so I need Hyper Hyper titis be short. So I want to talk with someone Please call me on this number, [phone]. Thank you so much. Please call me.

## 2023-10-30 NOTE — PROGRESS NOTES
Patient in office for Hep B adult   Patient will have to come back at 2 months , and after 6 months-

## 2023-11-14 DIAGNOSIS — G89.29 CHRONIC INTRACTABLE HEADACHE, UNSPECIFIED HEADACHE TYPE: ICD-10-CM

## 2023-11-14 DIAGNOSIS — R51.9 CHRONIC INTRACTABLE HEADACHE, UNSPECIFIED HEADACHE TYPE: ICD-10-CM

## 2023-11-14 RX ORDER — AMITRIPTYLINE HYDROCHLORIDE 25 MG/1
25 TABLET, FILM COATED ORAL
Qty: 90 TABLET | Refills: 0 | Status: SHIPPED | OUTPATIENT
Start: 2023-11-14

## 2023-12-29 ENCOUNTER — TELEPHONE (OUTPATIENT)
Dept: FAMILY MEDICINE CLINIC | Facility: CLINIC | Age: 38
End: 2023-12-29

## 2023-12-29 NOTE — TELEPHONE ENCOUNTER
"VM from pt:    \"Hi, my name is Dawit. I'm patient of doctor Isael Beltran. So I need to reschedule my appointment. So there was an emergency. That's why I couldn't come. Please call me on this number, [phone]. Thank you.\"    *LVM for pt to call office to r/s or via my chart.  "

## 2024-01-24 ENCOUNTER — OFFICE VISIT (OUTPATIENT)
Dept: FAMILY MEDICINE CLINIC | Facility: CLINIC | Age: 39
End: 2024-01-24
Payer: COMMERCIAL

## 2024-01-24 VITALS
BODY MASS INDEX: 27.83 KG/M2 | HEIGHT: 64 IN | DIASTOLIC BLOOD PRESSURE: 84 MMHG | OXYGEN SATURATION: 68 % | SYSTOLIC BLOOD PRESSURE: 122 MMHG | WEIGHT: 163 LBS | HEART RATE: 68 BPM | TEMPERATURE: 97 F

## 2024-01-24 DIAGNOSIS — G89.29 CHRONIC INTRACTABLE HEADACHE, UNSPECIFIED HEADACHE TYPE: ICD-10-CM

## 2024-01-24 DIAGNOSIS — R51.9 CHRONIC INTRACTABLE HEADACHE, UNSPECIFIED HEADACHE TYPE: ICD-10-CM

## 2024-01-24 DIAGNOSIS — B35.9 TINEA: Primary | ICD-10-CM

## 2024-01-24 DIAGNOSIS — M54.12 CERVICAL RADICULOPATHY: ICD-10-CM

## 2024-01-24 DIAGNOSIS — R10.13 EPIGASTRIC PAIN: ICD-10-CM

## 2024-01-24 PROCEDURE — 99214 OFFICE O/P EST MOD 30 MIN: CPT | Performed by: STUDENT IN AN ORGANIZED HEALTH CARE EDUCATION/TRAINING PROGRAM

## 2024-01-24 RX ORDER — MAGNESIUM GLYCINATE 100 MG
1 CAPSULE ORAL
Qty: 90 CAPSULE | Refills: 1 | Status: SHIPPED | OUTPATIENT
Start: 2024-01-24 | End: 2024-01-24

## 2024-01-24 RX ORDER — PRENATAL VIT 91/IRON/FOLIC/DHA 28-975-200
COMBINATION PACKAGE (EA) ORAL 2 TIMES DAILY
Qty: 15 G | Refills: 1 | Status: SHIPPED | OUTPATIENT
Start: 2024-01-24

## 2024-01-24 RX ORDER — MAGNESIUM GLYCINATE 100 MG
1 CAPSULE ORAL
Qty: 90 CAPSULE | Refills: 1 | Status: SHIPPED | OUTPATIENT
Start: 2024-01-24

## 2024-01-24 RX ORDER — PRENATAL VIT 91/IRON/FOLIC/DHA 28-975-200
COMBINATION PACKAGE (EA) ORAL 2 TIMES DAILY
Qty: 15 G | Refills: 1 | Status: SHIPPED | OUTPATIENT
Start: 2024-01-24 | End: 2024-01-24

## 2024-01-24 NOTE — PROGRESS NOTES
Assessment/Plan:         Problem List Items Addressed This Visit    None  Visit Diagnoses     Tinea    -  Primary    Relevant Medications    terbinafine (LamISIL) 1 % cream    Chronic intractable headache, unspecified headache type        Relevant Medications    Riboflavin 400 MG CAPS    Magnesium Glycinate 665 MG CAPS    Epigastric pain        Cervical radiculopathy            Prophylactic vitamins for the headaches to avoid advil which is likely causing the heart burn. Exercise     Subjective:      Patient ID: Dawit Mckeon is a 39 y.o. female.    Migraine  Associated symptoms include abdominal pain and neck pain. Pertinent negatives include no coughing, fever, nausea, rhinorrhea, sore throat or vomiting.     Foot fungus    Headache: taking advil 2x a day fort he headaches. At times feels heaviness and a hangover type headache. Was on TCA in the past but felt sleepy. At times gets sensitivity to the light. When she is tired the headache is worse, if she gets less sleepy she also gets the headache.    Stomach ache: feels liek a swelling in the epigastric region. Started after the headaches. At times feels heaviness in throat when she feels the stomach pain. Has been burping and at times feels more. Started after taking advil daily    Left upper extrmeity pain and weakness since starting job at [employer]. Improves with exercise.    The following portions of the patient's history were reviewed and updated as appropriate:   Past Medical History:  She has no past medical history on file.,  _______________________________________________________________________  Medical Problems:  does not have a problem list on file.,  _______________________________________________________________________  Past Surgical History:   has no past surgical history on file.,  _______________________________________________________________________  Family History:  family history includes Diabetes in her father and  mother.,  _______________________________________________________________________  Social History:   reports that she has never smoked. She has never used smokeless tobacco. She reports that she does not drink alcohol and does not use drugs.,  _______________________________________________________________________  Allergies:  is allergic to seasonal ic [cholestatin]..  _______________________________________________________________________  Current Outpatient Medications   Medication Sig Dispense Refill   • calcium carbonate (TUMS) 500 mg chewable tablet Chew 1 tablet (500 mg total) 2 (two) times a day as needed for indigestion or heartburn 60 tablet 1   • fluticasone (FLONASE) 50 mcg/act nasal spray 2 sprays into each nostril daily 16 mL 1   • loratadine (CLARITIN) 10 mg tablet Take 1 tablet (10 mg total) by mouth daily 90 tablet 1   • Magnesium Glycinate 665 MG CAPS Take 1 capsule by mouth daily before dinner 90 capsule 1   • polyethylene glycol (GLYCOLAX) 17 GM/SCOOP powder Take 17 g by mouth daily 507 g 0   • Prenatal Vit-Fe Fum-FA-Omega (Prenatal Multi +DHA) 27-0.8-228 MG CAPS Take 1 capsule by mouth in the morning 90 capsule 1   • Riboflavin 400 MG CAPS Take 1 capsule (400 mg total) by mouth daily before dinner 90 capsule 1   • terbinafine (LamISIL) 1 % cream Apply topically 2 (two) times a day 15 g 1     No current facility-administered medications for this visit.     _______________________________________________________________________  Review of Systems   Constitutional:  Negative for activity change, appetite change, fatigue and fever.   HENT:  Negative for congestion, rhinorrhea and sore throat.    Eyes:  Negative for visual disturbance.   Respiratory:  Negative for cough and shortness of breath.    Cardiovascular:  Negative for chest pain.   Gastrointestinal:  Positive for abdominal pain. Negative for nausea and vomiting.   Musculoskeletal:  Positive for neck pain.   Skin:  Positive for rash.  "  Neurological:  Positive for headaches.         Objective:  Vitals:    01/24/24 1320   BP: 122/84   BP Location: Left arm   Patient Position: Sitting   Cuff Size: Standard   Pulse: 68   Temp: (!) 97 °F (36.1 °C)   TempSrc: Tympanic   SpO2: (!) 68%   Weight: 73.9 kg (163 lb)   Height: 5' 4\" (1.626 m)     Body mass index is 27.98 kg/m².     Physical Exam  Constitutional:       General: She is not in acute distress.     Appearance: Normal appearance. She is not ill-appearing.   HENT:      Head: Normocephalic and atraumatic.      Right Ear: External ear normal.      Left Ear: External ear normal.      Nose: Nose normal. No congestion or rhinorrhea.      Mouth/Throat:      Mouth: Mucous membranes are moist.      Pharynx: Oropharynx is clear. No oropharyngeal exudate or posterior oropharyngeal erythema.   Eyes:      Extraocular Movements: Extraocular movements intact.      Conjunctiva/sclera: Conjunctivae normal.      Pupils: Pupils are equal, round, and reactive to light.   Cardiovascular:      Rate and Rhythm: Normal rate and regular rhythm.      Pulses: Normal pulses.      Heart sounds: No murmur heard.  Pulmonary:      Effort: Pulmonary effort is normal. No respiratory distress.      Breath sounds: Normal breath sounds. No wheezing.   Chest:      Chest wall: No tenderness.   Abdominal:      General: Bowel sounds are normal.      Palpations: Abdomen is soft.      Tenderness: There is no abdominal tenderness.   Musculoskeletal:         General: Normal range of motion.      Cervical back: Normal range of motion.   Skin:     General: Skin is warm and dry.      Capillary Refill: Capillary refill takes less than 2 seconds.      Findings: No rash.   Neurological:      General: No focal deficit present.      Mental Status: She is alert. Mental status is at baseline.         "

## 2024-02-21 ENCOUNTER — OFFICE VISIT (OUTPATIENT)
Dept: FAMILY MEDICINE CLINIC | Facility: CLINIC | Age: 39
End: 2024-02-21
Payer: COMMERCIAL

## 2024-02-21 VITALS
WEIGHT: 157 LBS | HEIGHT: 64 IN | HEART RATE: 91 BPM | TEMPERATURE: 98 F | BODY MASS INDEX: 26.8 KG/M2 | SYSTOLIC BLOOD PRESSURE: 130 MMHG | DIASTOLIC BLOOD PRESSURE: 74 MMHG | OXYGEN SATURATION: 98 %

## 2024-02-21 DIAGNOSIS — K59.00 CONSTIPATION, UNSPECIFIED CONSTIPATION TYPE: ICD-10-CM

## 2024-02-21 DIAGNOSIS — K21.9 GASTROESOPHAGEAL REFLUX DISEASE, UNSPECIFIED WHETHER ESOPHAGITIS PRESENT: ICD-10-CM

## 2024-02-21 DIAGNOSIS — Z00.00 ANNUAL PHYSICAL EXAM: Primary | ICD-10-CM

## 2024-02-21 PROCEDURE — 99395 PREV VISIT EST AGE 18-39: CPT | Performed by: STUDENT IN AN ORGANIZED HEALTH CARE EDUCATION/TRAINING PROGRAM

## 2024-02-21 PROCEDURE — 99213 OFFICE O/P EST LOW 20 MIN: CPT | Performed by: STUDENT IN AN ORGANIZED HEALTH CARE EDUCATION/TRAINING PROGRAM

## 2024-02-21 RX ORDER — FAMOTIDINE 20 MG/1
20 TABLET, FILM COATED ORAL
Qty: 90 TABLET | Refills: 1 | Status: SHIPPED | OUTPATIENT
Start: 2024-02-21 | End: 2024-02-21 | Stop reason: SDUPTHER

## 2024-02-21 RX ORDER — FAMOTIDINE 20 MG/1
20 TABLET, FILM COATED ORAL
Qty: 90 TABLET | Refills: 1 | Status: SHIPPED | OUTPATIENT
Start: 2024-02-21 | End: 2025-02-15

## 2024-02-21 NOTE — PROGRESS NOTES
"ADULT ANNUAL PHYSICAL  [facility] [address] [facility]    NAME: Dawit Mckeon  AGE: 39 y.o. SEX: female  : 1985     DATE: 2024     Assessment and Plan:     Problem List Items Addressed This Visit    None  Visit Diagnoses     Annual physical exam    -  Primary    BMI 26.0-26.9,adult        Constipation, unspecified constipation type        Gastroesophageal reflux disease, unspecified whether esophagitis present        Relevant Medications    famotidine (PEPCID) 20 mg tablet            Immunizations and preventive care screenings were discussed with patient today. Appropriate education was printed on patient's after visit summary.    Counseling:  Exercise: the importance of regular exercise/physical activity was discussed. Recommend exercise 3-5 times per week for at least 30 minutes.          Return in 1 year (on 2025) for Annual physical.     Chief Complaint:     Chief Complaint   Patient presents with   • Physical Exam      History of Present Illness:     Adult Annual Physical   Patient here for a comprehensive physical exam. The patient reports problems - see below .    Epigastric pain. Hard to describe, when she feels it her whole bopdy feels \"lazy\". Feels this pain almost everyday. No ETOH or nsaids. No foods have triggered the abdominal pain. If she takes a walk after eating the pain improve. Bowel movements can make the pain improve. Has a bowel movement every day, other days every other day. Mirallaax has helped with both the pain and constip[ation. Occasional burning in epigastric area extedning up as welll    At times feels very sore, works at [employer].    Diet and Physical Activity  Diet/Nutrition: well balanced diet.   Exercise: walking.      Depression Screening  PHQ-2/9 Depression Screening         General Health  Sleep: sleeps well.   Hearing: normal - bilateral.  Vision: no vision problems and goes for regular eye exams. "   Dental: regular dental visits.         Review of Systems:     Review of Systems   Constitutional:  Negative for activity change, appetite change, fatigue and fever.   HENT:  Negative for congestion, rhinorrhea and sore throat.    Eyes:  Negative for visual disturbance.   Respiratory:  Negative for cough and shortness of breath.    Cardiovascular:  Negative for chest pain.   Gastrointestinal:  Positive for abdominal pain. Negative for nausea and vomiting.      Past Medical History:     History reviewed. No pertinent past medical history.   Past Surgical History:     History reviewed. No pertinent surgical history.   Social History:     Social History     Socioeconomic History   • Marital status: /Civil Union     Spouse name: None   • Number of children: None   • Years of education: None   • Highest education level: None   Occupational History   • None   Tobacco Use   • Smoking status: Never   • Smokeless tobacco: Never   Vaping Use   • Vaping status: Never Used   Substance and Sexual Activity   • Alcohol use: Never   • Drug use: Never   • Sexual activity: Not Currently   Other Topics Concern   • None   Social History Narrative   • None     Social Determinants of Health     Financial Resource Strain: Not on file   Food Insecurity: Not on file   Transportation Needs: Not on file   Physical Activity: Not on file   Stress: Not on file   Social Connections: Not on file   Intimate Partner Violence: Not on file   Housing Stability: Not on file      Family History:     Family History   Problem Relation Age of Onset   • Diabetes Mother    • Diabetes Father       Current Medications:     Current Outpatient Medications   Medication Sig Dispense Refill   • famotidine (PEPCID) 20 mg tablet Take 1 tablet (20 mg total) by mouth daily at bedtime 90 tablet 1   • calcium carbonate (TUMS) 500 mg chewable tablet Chew 1 tablet (500 mg total) 2 (two) times a day as needed for indigestion or heartburn 60 tablet 1   • fluticasone  "(FLONASE) 50 mcg/act nasal spray 2 sprays into each nostril daily 16 mL 1   • loratadine (CLARITIN) 10 mg tablet Take 1 tablet (10 mg total) by mouth daily 90 tablet 1   • Magnesium Glycinate 665 MG CAPS Take 1 capsule by mouth daily before dinner 90 capsule 1   • polyethylene glycol (GLYCOLAX) 17 GM/SCOOP powder Take 17 g by mouth daily 507 g 0   • Prenatal Vit-Fe Fum-FA-Omega (Prenatal Multi +DHA) 27-0.8-228 MG CAPS Take 1 capsule by mouth in the morning 90 capsule 1   • Riboflavin 400 MG CAPS Take 1 capsule (400 mg total) by mouth daily before dinner 90 capsule 1   • terbinafine (LamISIL) 1 % cream Apply topically 2 (two) times a day 15 g 1     No current facility-administered medications for this visit.      Allergies:     Allergies   Allergen Reactions   • Seasonal Ic [Cholestatin] Itching     Eyes, nose ,and throat.      Physical Exam:     /74   Pulse 91   Temp 98 °F (36.7 °C)   Ht 5' 4\" (1.626 m)   Wt 71.2 kg (157 lb)   SpO2 98%   BMI 26.95 kg/m²     Physical Exam  Constitutional:       General: She is not in acute distress.     Appearance: Normal appearance. She is not ill-appearing.   HENT:      Head: Normocephalic and atraumatic.      Right Ear: Tympanic membrane, ear canal and external ear normal.      Left Ear: Tympanic membrane, ear canal and external ear normal.      Nose: Nose normal.      Mouth/Throat:      Mouth: Mucous membranes are moist.      Pharynx: Oropharynx is clear. No oropharyngeal exudate or posterior oropharyngeal erythema.   Eyes:      General: No scleral icterus.        Right eye: No discharge.         Left eye: No discharge.      Extraocular Movements: Extraocular movements intact.      Conjunctiva/sclera: Conjunctivae normal.      Pupils: Pupils are equal, round, and reactive to light.   Cardiovascular:      Rate and Rhythm: Normal rate and regular rhythm.      Pulses: Normal pulses.      Heart sounds: Normal heart sounds. No murmur heard.  Pulmonary:      Effort: " Pulmonary effort is normal. No respiratory distress.      Breath sounds: Normal breath sounds.   Abdominal:      General: Bowel sounds are normal.      Palpations: Abdomen is soft.      Tenderness: There is no abdominal tenderness.   Musculoskeletal:         General: Normal range of motion.      Cervical back: Normal range of motion and neck supple.   Lymphadenopathy:      Cervical: No cervical adenopathy.   Skin:     General: Skin is warm and dry.      Capillary Refill: Capillary refill takes less than 2 seconds.   Neurological:      General: No focal deficit present.      Mental Status: She is alert and oriented to person, place, and time. Mental status is at baseline.      Cranial Nerves: No cranial nerve deficit.   Psychiatric:         Mood and Affect: Mood normal.          Celia Beltran MD   [facility] [address] [facility]

## 2024-04-17 ENCOUNTER — APPOINTMENT (EMERGENCY)
Dept: CT IMAGING | Facility: HOSPITAL | Age: 39
End: 2024-04-17

## 2024-04-17 ENCOUNTER — HOSPITAL ENCOUNTER (EMERGENCY)
Facility: HOSPITAL | Age: 39
Discharge: HOME | End: 2024-04-17
Attending: EMERGENCY MEDICINE

## 2024-04-17 VITALS
HEART RATE: 78 BPM | DIASTOLIC BLOOD PRESSURE: 78 MMHG | OXYGEN SATURATION: 99 % | SYSTOLIC BLOOD PRESSURE: 124 MMHG | RESPIRATION RATE: 18 BRPM | TEMPERATURE: 98.1 F

## 2024-04-17 DIAGNOSIS — R51.9 ACUTE NONINTRACTABLE HEADACHE, UNSPECIFIED HEADACHE TYPE: Primary | ICD-10-CM

## 2024-04-17 DIAGNOSIS — R93.0 ABNORMAL CT OF THE HEAD: ICD-10-CM

## 2024-04-17 LAB
ANION GAP SERPL CALCULATED.3IONS-SCNC: 9 MMOL/L (ref 4–13)
BASOPHILS # BLD AUTO: 0.02 THOUSANDS/ÂΜL (ref 0–0.1)
BASOPHILS NFR BLD AUTO: 0 % (ref 0–1)
BUN SERPL-MCNC: 11 MG/DL (ref 5–25)
CALCIUM SERPL-MCNC: 8.9 MG/DL (ref 8.4–10.2)
CHLORIDE SERPL-SCNC: 103 MMOL/L (ref 96–108)
CO2 SERPL-SCNC: 27 MMOL/L (ref 21–32)
CREAT SERPL-MCNC: 0.54 MG/DL (ref 0.6–1.3)
EOSINOPHIL # BLD AUTO: 0.15 THOUSAND/ÂΜL (ref 0–0.61)
EOSINOPHIL NFR BLD AUTO: 2 % (ref 0–6)
ERYTHROCYTE [DISTWIDTH] IN BLOOD BY AUTOMATED COUNT: 12.9 % (ref 11.6–15.1)
GFR SERPL CREATININE-BSD FRML MDRD: 120 ML/MIN/1.73SQ M
GLUCOSE SERPL-MCNC: 86 MG/DL (ref 65–140)
HCG SERPL QL: NEGATIVE
HCT VFR BLD AUTO: 38.1 % (ref 34.8–46.1)
HGB BLD-MCNC: 12.2 G/DL (ref 11.5–15.4)
IMM GRANULOCYTES # BLD AUTO: 0.01 THOUSAND/UL (ref 0–0.2)
IMM GRANULOCYTES NFR BLD AUTO: 0 % (ref 0–2)
LYMPHOCYTES # BLD AUTO: 2.2 THOUSANDS/ÂΜL (ref 0.6–4.47)
LYMPHOCYTES NFR BLD AUTO: 33 % (ref 14–44)
MCH RBC QN AUTO: 29.6 PG (ref 26.8–34.3)
MCHC RBC AUTO-ENTMCNC: 32 G/DL (ref 31.4–37.4)
MCV RBC AUTO: 93 FL (ref 82–98)
MONOCYTES # BLD AUTO: 0.43 THOUSAND/ÂΜL (ref 0.17–1.22)
MONOCYTES NFR BLD AUTO: 7 % (ref 4–12)
NEUTROPHILS # BLD AUTO: 3.78 THOUSANDS/ÂΜL (ref 1.85–7.62)
NEUTS SEG NFR BLD AUTO: 58 % (ref 43–75)
NRBC BLD AUTO-RTO: 0 /100 WBCS
PLATELET # BLD AUTO: 207 THOUSANDS/UL (ref 149–390)
PMV BLD AUTO: 11.7 FL (ref 8.9–12.7)
POTASSIUM SERPL-SCNC: 3.8 MMOL/L (ref 3.5–5.3)
RBC # BLD AUTO: 4.12 MILLION/UL (ref 3.81–5.12)
SODIUM SERPL-SCNC: 139 MMOL/L (ref 135–147)
WBC # BLD AUTO: 6.59 THOUSAND/UL (ref 4.31–10.16)

## 2024-04-17 PROCEDURE — 84703 CHORIONIC GONADOTROPIN ASSAY: CPT | Performed by: EMERGENCY MEDICINE

## 2024-04-17 PROCEDURE — 96365 THER/PROPH/DIAG IV INF INIT: CPT

## 2024-04-17 PROCEDURE — 85025 COMPLETE CBC W/AUTO DIFF WBC: CPT | Performed by: EMERGENCY MEDICINE

## 2024-04-17 PROCEDURE — 70450 CT HEAD/BRAIN W/O DYE: CPT

## 2024-04-17 PROCEDURE — 36415 COLL VENOUS BLD VENIPUNCTURE: CPT | Performed by: EMERGENCY MEDICINE

## 2024-04-17 PROCEDURE — 80048 BASIC METABOLIC PNL TOTAL CA: CPT | Performed by: EMERGENCY MEDICINE

## 2024-04-17 PROCEDURE — 96375 TX/PRO/DX INJ NEW DRUG ADDON: CPT

## 2024-04-17 PROCEDURE — 99284 EMERGENCY DEPT VISIT MOD MDM: CPT | Performed by: EMERGENCY MEDICINE

## 2024-04-17 PROCEDURE — 99285 EMERGENCY DEPT VISIT HI MDM: CPT

## 2024-04-17 RX ORDER — KETOROLAC TROMETHAMINE 30 MG/ML
15 INJECTION, SOLUTION INTRAMUSCULAR; INTRAVENOUS ONCE
Status: COMPLETED | OUTPATIENT
Start: 2024-04-17 | End: 2024-04-17

## 2024-04-17 RX ORDER — MAGNESIUM SULFATE HEPTAHYDRATE 40 MG/ML
2 INJECTION, SOLUTION INTRAVENOUS ONCE
Status: COMPLETED | OUTPATIENT
Start: 2024-04-17 | End: 2024-04-17

## 2024-04-17 RX ORDER — DIPHENHYDRAMINE HYDROCHLORIDE 50 MG/ML
25 INJECTION INTRAMUSCULAR; INTRAVENOUS ONCE
Status: COMPLETED | OUTPATIENT
Start: 2024-04-17 | End: 2024-04-17

## 2024-04-17 RX ORDER — DEXAMETHASONE SODIUM PHOSPHATE 10 MG/ML
10 INJECTION, SOLUTION INTRAMUSCULAR; INTRAVENOUS ONCE
Status: COMPLETED | OUTPATIENT
Start: 2024-04-17 | End: 2024-04-17

## 2024-04-17 RX ORDER — METOCLOPRAMIDE HYDROCHLORIDE 5 MG/ML
10 INJECTION INTRAMUSCULAR; INTRAVENOUS ONCE
Status: COMPLETED | OUTPATIENT
Start: 2024-04-17 | End: 2024-04-17

## 2024-04-17 RX ORDER — ACETAMINOPHEN 325 MG/1
975 TABLET ORAL ONCE
Status: COMPLETED | OUTPATIENT
Start: 2024-04-17 | End: 2024-04-17

## 2024-04-17 RX ADMIN — SODIUM CHLORIDE 1000 ML: 0.9 INJECTION, SOLUTION INTRAVENOUS at 15:32

## 2024-04-17 RX ADMIN — DEXAMETHASONE SODIUM PHOSPHATE 10 MG: 10 INJECTION, SOLUTION INTRAMUSCULAR; INTRAVENOUS at 15:36

## 2024-04-17 RX ADMIN — KETOROLAC TROMETHAMINE 15 MG: 30 INJECTION, SOLUTION INTRAMUSCULAR at 17:18

## 2024-04-17 RX ADMIN — METOCLOPRAMIDE 10 MG: 5 INJECTION, SOLUTION INTRAMUSCULAR; INTRAVENOUS at 15:40

## 2024-04-17 RX ADMIN — ACETAMINOPHEN 975 MG: 325 TABLET, FILM COATED ORAL at 17:18

## 2024-04-17 RX ADMIN — DIPHENHYDRAMINE HYDROCHLORIDE 25 MG: 50 INJECTION, SOLUTION INTRAMUSCULAR; INTRAVENOUS at 15:32

## 2024-04-17 RX ADMIN — MAGNESIUM SULFATE HEPTAHYDRATE 2 G: 40 INJECTION, SOLUTION INTRAVENOUS at 15:58

## 2024-04-17 NOTE — ED PROVIDER NOTES
History  Chief Complaint   Patient presents with    Headache     Pt presents to the ed with a headache that started three days, light and noise sensitive, no relief with advil at 1200, reports nausea      39-year-old female with history of migraine headaches who presents for evaluation of headache.  Patient reports ongoing headache for the past 3 days.  She has had associated photophobia and phonophobia along with nausea.  She had some vomiting 2 days ago which subsequently resolved.  Her pain is primarily left-sided in nature.  She reports similar headaches in the past and has been suffering with them for approximately 15 years.  This is not the worst headache of her life.  There are no new features of her headache.  She has never had her headaches evaluated, however.  She tried taking Advil, naproxen, and rizatriptan prescribed at urgent care 2 days ago with minimal relief.  She denies any vision changes, numbness.  She feels generally weak and fatigued.        Prior to Admission Medications   Prescriptions Last Dose Informant Patient Reported? Taking?   Magnesium Glycinate 665 MG CAPS   No No   Sig: Take 1 capsule by mouth daily before dinner   Prenatal Vit-Fe Fum-FA-Omega (Prenatal Multi +DHA) 27-0.8-228 MG CAPS   No No   Sig: Take 1 capsule by mouth in the morning   Riboflavin 400 MG CAPS   No No   Sig: Take 1 capsule (400 mg total) by mouth daily before dinner   calcium carbonate (TUMS) 500 mg chewable tablet   No No   Sig: Chew 1 tablet (500 mg total) 2 (two) times a day as needed for indigestion or heartburn   famotidine (PEPCID) 20 mg tablet   No No   Sig: Take 1 tablet (20 mg total) by mouth daily at bedtime   fluticasone (FLONASE) 50 mcg/act nasal spray   No No   Si sprays into each nostril daily   loratadine (CLARITIN) 10 mg tablet   No No   Sig: Take 1 tablet (10 mg total) by mouth daily   polyethylene glycol (GLYCOLAX) 17 GM/SCOOP powder   No No   Sig: Take 17 g by mouth daily   terbinafine  (LamISIL) 1 % cream   No No   Sig: Apply topically 2 (two) times a day      Facility-Administered Medications: None       History reviewed. No pertinent past medical history.    History reviewed. No pertinent surgical history.    Family History   Problem Relation Age of Onset    Diabetes Mother     Diabetes Father      I have reviewed and agree with the history as documented.    E-Cigarette/Vaping    E-Cigarette Use Never User      E-Cigarette/Vaping Substances    Nicotine No     THC No     CBD No     Flavoring No     Other No     Unknown No      Social History     Tobacco Use    Smoking status: Never    Smokeless tobacco: Never   Vaping Use    Vaping status: Never Used   Substance Use Topics    Alcohol use: Never    Drug use: Never       Review of Systems   Constitutional:  Negative for chills and fever.   Eyes:  Positive for photophobia. Negative for visual disturbance.   Respiratory:  Negative for cough and shortness of breath.    Cardiovascular:  Negative for chest pain.   Gastrointestinal:  Positive for nausea and vomiting. Negative for abdominal pain and diarrhea.   Genitourinary:  Negative for dysuria, flank pain and frequency.   Musculoskeletal:  Negative for gait problem.   Skin:  Negative for rash.   Neurological:  Positive for weakness (generalized) and headaches. Negative for numbness.   All other systems reviewed and are negative.      Physical Exam  Physical Exam  Vitals and nursing note reviewed.   Constitutional:       General: She is not in acute distress.     Appearance: She is well-developed. She is not ill-appearing.   HENT:      Head: Normocephalic and atraumatic.      Nose: Nose normal.      Mouth/Throat:      Mouth: Mucous membranes are moist.   Eyes:      Extraocular Movements: Extraocular movements intact.      Conjunctiva/sclera: Conjunctivae normal.      Pupils: Pupils are equal, round, and reactive to light.   Cardiovascular:      Rate and Rhythm: Normal rate and regular rhythm.       Heart sounds: No murmur heard.     No friction rub. No gallop.   Pulmonary:      Effort: Pulmonary effort is normal.      Breath sounds: Normal breath sounds. No wheezing, rhonchi or rales.   Abdominal:      General: There is no distension.      Palpations: Abdomen is soft.      Tenderness: There is no abdominal tenderness.   Musculoskeletal:         General: No swelling or tenderness. Normal range of motion.      Cervical back: Normal range of motion and neck supple.   Skin:     General: Skin is warm and dry.      Coloration: Skin is not pale.      Findings: No rash.   Neurological:      General: No focal deficit present.      Mental Status: She is alert and oriented to person, place, and time.      Cranial Nerves: No cranial nerve deficit.      Sensory: No sensory deficit.      Motor: No weakness.      Coordination: Coordination normal.      Gait: Gait normal.   Psychiatric:         Behavior: Behavior normal.         Vital Signs  ED Triage Vitals   Temperature Pulse Respirations Blood Pressure SpO2   04/17/24 1507 04/17/24 1507 04/17/24 1507 04/17/24 1507 04/17/24 1507   98.1 °F (36.7 °C) 74 18 133/84 99 %      Temp Source Heart Rate Source Patient Position - Orthostatic VS BP Location FiO2 (%)   04/17/24 1507 04/17/24 1507 04/17/24 1507 04/17/24 1507 --   Oral Monitor Lying Left arm       Pain Score       04/17/24 1718       2           Vitals:    04/17/24 1507 04/17/24 1840   BP: 133/84 124/78   Pulse: 74 78   Patient Position - Orthostatic VS: Lying Sitting         Visual Acuity      ED Medications  Medications   dexamethasone (PF) (DECADRON) injection 10 mg (10 mg Intravenous Given 4/17/24 1536)   metoclopramide (REGLAN) injection 10 mg (10 mg Intravenous Given 4/17/24 1540)   diphenhydrAMINE (BENADRYL) injection 25 mg (25 mg Intravenous Given 4/17/24 1532)   magnesium sulfate 2 g/50 mL IVPB (premix) 2 g (0 g Intravenous Stopped 4/17/24 1718)   sodium chloride 0.9 % bolus 1,000 mL (0 mL Intravenous Stopped  4/17/24 1718)   ketorolac (TORADOL) injection 15 mg (15 mg Intravenous Given 4/17/24 1718)   acetaminophen (TYLENOL) tablet 975 mg (975 mg Oral Given 4/17/24 1718)       Diagnostic Studies  Results Reviewed       Procedure Component Value Units Date/Time    hCG, qualitative pregnancy [[id number]]  (Normal) Collected: 04/17/24 1542    Lab Status: Final result Specimen: Blood from Arm, Right Updated: 04/17/24 1620     Preg, Serum Negative    Basic metabolic panel [[id number]]  (Abnormal) Collected: 04/17/24 1542    Lab Status: Final result Specimen: Blood from Arm, Right Updated: 04/17/24 1611     Sodium 139 mmol/L      Potassium 3.8 mmol/L      Chloride 103 mmol/L      CO2 27 mmol/L      ANION GAP 9 mmol/L      BUN 11 mg/dL      Creatinine 0.54 mg/dL      Glucose 86 mg/dL      Calcium 8.9 mg/dL      eGFR 120 ml/min/1.73sq m     Narrative:      National Kidney Disease Foundation guidelines for Chronic Kidney Disease (CKD):     Stage 1 with normal or high GFR (GFR > 90 mL/min/1.73 square meters)    Stage 2 Mild CKD (GFR = 60-89 mL/min/1.73 square meters)    Stage 3A Moderate CKD (GFR = 45-59 mL/min/1.73 square meters)    Stage 3B Moderate CKD (GFR = 30-44 mL/min/1.73 square meters)    Stage 4 Severe CKD (GFR = 15-29 mL/min/1.73 square meters)    Stage 5 End Stage CKD (GFR <15 mL/min/1.73 square meters)  Note: GFR calculation is accurate only with a steady state creatinine    CBC and differential [[id number]] Collected: 04/17/24 1542    Lab Status: Final result Specimen: Blood from Arm, Right Updated: 04/17/24 1557     WBC 6.59 Thousand/uL      RBC 4.12 Million/uL      Hemoglobin 12.2 g/dL      Hematocrit 38.1 %      MCV 93 fL      MCH 29.6 pg      MCHC 32.0 g/dL      RDW 12.9 %      MPV 11.7 fL      Platelets 207 Thousands/uL      nRBC 0 /100 WBCs      Segmented % 58 %      Immature Grans % 0 %      Lymphocytes % 33 %      Monocytes % 7 %      Eosinophils Relative 2 %      Basophils Relative 0 %      Absolute Neutrophils  3.78 Thousands/µL      Absolute Immature Grans 0.01 Thousand/uL      Absolute Lymphocytes 2.20 Thousands/µL      Absolute Monocytes 0.43 Thousand/µL      Eosinophils Absolute 0.15 Thousand/µL      Basophils Absolute 0.02 Thousands/µL                    CT head without contrast   Final Result by Maryann Ahumada MD (04/17 1854)      No acute intracranial abnormality.      Partially empty sella and small lateral ventricles which may be normal for this patient. Given history of headaches, can consider idiopathic intracranial hypertension.         Workstation performed: [id number]                    Procedures  Procedures         ED Course  ED Course as of 04/17/24 2313 Wed Apr 17, 2024   1600 CBC and differential   1618 Basic metabolic panel(!)   1620 PREGNANCY, SERUM: Negative   1910 Patient re-evaluated. Headache is resolved at this time.                               SBIRT 22yo+      Flowsheet Row Most Recent Value   Initial Alcohol Screen: US AUDIT-C     1. How often do you have a drink containing alcohol? 0 Filed at: 04/17/2024 1507   2. How many drinks containing alcohol do you have on a typical day you are drinking?  0 Filed at: 04/17/2024 1507   3a. Male UNDER 65: How often do you have five or more drinks on one occasion? 0 Filed at: 04/17/2024 1507   3b. FEMALE Any Age, or MALE 65+: How often do you have 4 or more drinks on one occassion? 0 Filed at: 04/17/2024 1507   Audit-C Score 0 Filed at: 04/17/2024 1507   YANNI: How many times in the past year have you...    Used an illegal drug or used a prescription medication for non-medical reasons? Never Filed at: 04/17/2024 1507                      Medical Decision Making  39-year-old female presenting for evaluation of headache.  Normal neurologic exam.  No red flag signs or symptoms to suggest an acute intracranial pathology such as intracranial mass or intracranial hemorrhage.  Patient has been suffering with headaches for a significant period of time without  evaluation.  Will obtain workup including CT head.  Treated symptomatically with resolution of symptoms.  Labs overall unremarkable.  CT of the head showing possible findings of idiopathic intracranial hypertension.  Given resolution of patient's symptoms at this time, no further workup needed in the emergency department.  Patient was referred to neurology for follow-up.  Return precautions discussed.    Problems Addressed:  Abnormal CT of the head: acute illness or injury  Acute nonintractable headache, unspecified headache type: acute illness or injury    Amount and/or Complexity of Data Reviewed  Labs: ordered. Decision-making details documented in ED Course.  Radiology: ordered.    Risk  OTC drugs.  Prescription drug management.             Disposition  Final diagnoses:   Acute nonintractable headache, unspecified headache type   Abnormal CT of the head     Time reflects when diagnosis was documented in both MDM as applicable and the Disposition within this note       Time User Action Codes Description Comment    4/17/2024  7:15 PM Sowmya Carnes [R51.9] Acute nonintractable headache, unspecified headache type     4/17/2024  7:15 PM Sowmya Carnes Add [R93.0] Abnormal CT of the head           ED Disposition       ED Disposition   Discharge    Condition   Stable    Date/Time   Wed Apr 17, 2024 1915    Comment   Dawit Mckeon discharge to home/self care.                   Follow-up Information       Follow up With Specialties Details Why Contact Info Additional Information    [facility] Schedule an appointment as soon as possible for a visit  New primary care physician in the area [address] [facility] [address]  [phone] [facility], [address], [place], [address]   [phone]            Discharge Medication List as of 4/17/2024  7:17 PM        CONTINUE these medications which have NOT CHANGED    Details    calcium carbonate (TUMS) 500 mg chewable tablet Chew 1 tablet (500 mg total) 2 (two) times a day as needed for indigestion or heartburn, Starting Tue 1/24/2023, Normal      famotidine (PEPCID) 20 mg tablet Take 1 tablet (20 mg total) by mouth daily at bedtime, Starting Wed 2/21/2024, Until Sat 2/15/2025, Normal      fluticasone (FLONASE) 50 mcg/act nasal spray 2 sprays into each nostril daily, Starting Mon 8/14/2023, Normal      loratadine (CLARITIN) 10 mg tablet Take 1 tablet (10 mg total) by mouth daily, Starting Mon 8/14/2023, Normal      Magnesium Glycinate 665 MG CAPS Take 1 capsule by mouth daily before dinner, Starting Wed 1/24/2024, Normal      polyethylene glycol (GLYCOLAX) 17 GM/SCOOP powder Take 17 g by mouth daily, Starting Mon 8/14/2023, Normal      Prenatal Vit-Fe Fum-FA-Omega (Prenatal Multi +DHA) 27-0.8-228 MG CAPS Take 1 capsule by mouth in the morning, Starting Tue 1/24/2023, Normal      Riboflavin 400 MG CAPS Take 1 capsule (400 mg total) by mouth daily before dinner, Starting Wed 1/24/2024, Normal      terbinafine (LamISIL) 1 % cream Apply topically 2 (two) times a day, Starting Wed 1/24/2024, Normal                 PDMP Review       None            ED Provider  Electronically Signed by             Sowmya Carnes MD  04/17/24 4972

## 2024-04-17 NOTE — DISCHARGE INSTRUCTIONS
"Follow-up with neurology as well as your primary care physician.  You can take Tylenol and Motrin every 6 hours as needed for headaches.  You can take the previously prescribed rizatriptan as well.  Stay well-hydrated, get plenty of sleep.  Please return to the emergency department if you develop worsening symptoms, severe headache, uncontrolled vomiting, loss of vision, weakness, or anything else concerning to you.    CT results:  \"Partially empty sella and small lateral ventricles which may be normal for this patient. Given history of headaches, can consider idiopathic intracranial hypertension.\"    "

## 2024-10-08 NOTE — PROGRESS NOTES
"Ambulatory Visit  Name: Dawit Mckeon      : 1985      MRN: [id number]  Encounter Provider: Sera Shelley MD  Encounter Date: 10/9/2024   Encounter department: [facility] MEDICINE    Assessment & Plan  Seasonal allergies  Otc claritin       Gastroesophageal reflux disease without esophagitis         Chronic migraine without aura without status migrainosus, not intractable  Has had migraines for 16 yrs  has more than 15 per months  pt needs to see neurology had CT head with partial empty sella ; will try prophylaxis of migraines with propranol 80 LA qd ;  sumatriptan 100mg  at onset of  headache     Orders:    propranolol (INDERAL LA) 80 mg 24 hr capsule; Take 1 capsule (80 mg total) by mouth daily    SUMAtriptan (Imitrex) 100 mg tablet; Use 100mg at onset of migraine and  may repeat x 1 only in 2 hours    Ambulatory Referral to Neurology; Future    Empty sella (HCC)  Partially empty sella may be  increase Intracranial pressure given constant headaches will need to see neurology     Orders:    Ambulatory Referral to Neurology; Future    Acute pain of right shoulder  Will try PT      Orders:    Ambulatory Referral to Physical Therapy; Future       History of Present Illness     New pt here to establish care  has hx of migraines for 16 yrs using only advil which bothers her stomach         Review of Systems   HENT:  Positive for congestion.    Eyes:  Positive for itching.   Musculoskeletal:  Positive for arthralgias (right shoulder pain for 1 month).   Neurological:  Positive for dizziness and headaches (daily).           Objective     /72 (BP Location: Left arm, Patient Position: Sitting, Cuff Size: Standard)   Pulse 92   Temp 98.3 °F (36.8 °C) (Tympanic)   Resp 16   Ht 5' 4\" (1.626 m)   Wt 70.8 kg (156 lb)   LMP 09/10/2024   SpO2 98%   BMI 26.78 kg/m²     Physical Exam  Constitutional:       General: She is not in acute distress.     Appearance: Normal appearance. She is not " ill-appearing.   Eyes:      Extraocular Movements: Extraocular movements intact.      Pupils: Pupils are equal, round, and reactive to light.   Cardiovascular:      Rate and Rhythm: Normal rate and regular rhythm.      Heart sounds: Normal heart sounds.   Pulmonary:      Effort: Pulmonary effort is normal.      Breath sounds: Normal breath sounds.   Musculoskeletal:         General: Tenderness (right subacromial area  p[ain with elevation) present.      Cervical back: Neck supple. No tenderness.      Right lower leg: No edema.      Left lower leg: No edema.   Neurological:      General: No focal deficit present.      Mental Status: She is oriented to person, place, and time. Mental status is at baseline.      Cranial Nerves: No cranial nerve deficit.      Sensory: No sensory deficit.      Motor: No weakness.      Coordination: Coordination normal.      Gait: Gait normal.      Deep Tendon Reflexes: Reflexes abnormal (decreased).   Psychiatric:         Mood and Affect: Mood normal.         Behavior: Behavior normal.

## 2024-10-09 ENCOUNTER — OFFICE VISIT (OUTPATIENT)
Dept: FAMILY MEDICINE CLINIC | Facility: CLINIC | Age: 39
End: 2024-10-09
Payer: COMMERCIAL

## 2024-10-09 VITALS
TEMPERATURE: 98.3 F | SYSTOLIC BLOOD PRESSURE: 118 MMHG | WEIGHT: 156 LBS | HEIGHT: 64 IN | DIASTOLIC BLOOD PRESSURE: 72 MMHG | BODY MASS INDEX: 26.63 KG/M2 | RESPIRATION RATE: 16 BRPM | HEART RATE: 92 BPM | OXYGEN SATURATION: 98 %

## 2024-10-09 DIAGNOSIS — J30.2 SEASONAL ALLERGIES: Primary | ICD-10-CM

## 2024-10-09 DIAGNOSIS — K21.9 GASTROESOPHAGEAL REFLUX DISEASE WITHOUT ESOPHAGITIS: ICD-10-CM

## 2024-10-09 DIAGNOSIS — M25.511 ACUTE PAIN OF RIGHT SHOULDER: ICD-10-CM

## 2024-10-09 DIAGNOSIS — E23.6 EMPTY SELLA (HCC): ICD-10-CM

## 2024-10-09 DIAGNOSIS — G43.709 CHRONIC MIGRAINE WITHOUT AURA WITHOUT STATUS MIGRAINOSUS, NOT INTRACTABLE: ICD-10-CM

## 2024-10-09 PROCEDURE — 99204 OFFICE O/P NEW MOD 45 MIN: CPT | Performed by: FAMILY MEDICINE

## 2024-10-09 RX ORDER — SUMATRIPTAN 100 MG/1
TABLET, FILM COATED ORAL
Qty: 9 TABLET | Refills: 1 | Status: SHIPPED | OUTPATIENT
Start: 2024-10-09

## 2024-10-09 RX ORDER — PROPRANOLOL HYDROCHLORIDE 80 MG/1
80 CAPSULE, EXTENDED RELEASE ORAL DAILY
Qty: 30 CAPSULE | Refills: 3 | Status: SHIPPED | OUTPATIENT
Start: 2024-10-09

## 2024-10-09 RX ORDER — MEDROXYPROGESTERONE ACETATE 10 MG
TABLET ORAL
COMMUNITY
End: 2024-10-09

## 2024-10-09 NOTE — ASSESSMENT & PLAN NOTE
Has had migraines for 16 yrs  has more than 15 per months  pt needs to see neurology had CT head with partial empty sella ; will try prophylaxis of migraines with propranol 80 LA qd ;  sumatriptan 100mg  at onset of  headache     Orders:    propranolol (INDERAL LA) 80 mg 24 hr capsule; Take 1 capsule (80 mg total) by mouth daily    SUMAtriptan (Imitrex) 100 mg tablet; Use 100mg at onset of migraine and  may repeat x 1 only in 2 hours    Ambulatory Referral to Neurology; Future

## 2024-10-09 NOTE — ASSESSMENT & PLAN NOTE
Partially empty sella may be  increase Intracranial pressure given constant headaches will need to see neurology     Orders:    Ambulatory Referral to Neurology; Future

## 2024-10-28 ENCOUNTER — OFFICE VISIT (OUTPATIENT)
Dept: NEUROLOGY | Facility: CLINIC | Age: 39
End: 2024-10-28
Payer: COMMERCIAL

## 2024-10-28 VITALS
DIASTOLIC BLOOD PRESSURE: 78 MMHG | TEMPERATURE: 98.3 F | HEART RATE: 78 BPM | HEIGHT: 64 IN | WEIGHT: 156.9 LBS | SYSTOLIC BLOOD PRESSURE: 110 MMHG | BODY MASS INDEX: 26.79 KG/M2 | OXYGEN SATURATION: 98 %

## 2024-10-28 DIAGNOSIS — G43.709 CHRONIC MIGRAINE WITHOUT AURA WITHOUT STATUS MIGRAINOSUS, NOT INTRACTABLE: Primary | ICD-10-CM

## 2024-10-28 DIAGNOSIS — M54.2 CERVICALGIA: ICD-10-CM

## 2024-10-28 PROCEDURE — 99244 OFF/OP CNSLTJ NEW/EST MOD 40: CPT | Performed by: STUDENT IN AN ORGANIZED HEALTH CARE EDUCATION/TRAINING PROGRAM

## 2024-10-28 RX ORDER — SUMATRIPTAN 100 MG/1
TABLET, FILM COATED ORAL
Qty: 10 TABLET | Refills: 6 | Status: SHIPPED | OUTPATIENT
Start: 2024-10-28

## 2024-10-28 NOTE — PROGRESS NOTES
Neurology Ambulatory Visit  Name: Dawit Mckeon       : 1985       MRN: [id number]   Encounter Provider: Andrews Peters DO   Encounter Date: 10/28/2024  Encounter department: [facility]    Dawit Mckeon is a 39 y.o.  right handed female with a past medical history significant for chronic migraines, GERD who is presenting to the Neurology office for evaluation of headaches.    Assessment and Plan  1. Chronic migraine without aura without status migrainosus, not intractable  Assessment & Plan:  Ms. Mckeon is here today for evaluation of migraines that have been going on since her early 20s.  She has never been formally evaluated for them before.  She was recently started on propranolol and sumatriptan by her PCP so I will have her continue with those for the time being.  She will update me in approximately 3 to 4 weeks at which point we can consider increasing the dose of propranolol if needed.  I emphasized the importance of not taking abortive medication more than 2 to 3 days/week.  Due to headaches occasionally waking her up from sleep and her previous findings of empty sella, I suggested that we get an MRI of the brain for further evaluation.  Orders:  -     Ambulatory Referral to Neurology  -     MRI brain with and without contrast; Future; Expected date: 10/28/2024  -     SUMAtriptan (Imitrex) 100 mg tablet; Use 100mg at onset of migraine and  may repeat x 1 only in 2 hours. No more than 2 tablets in 24 hours  2. Cervicalgia  -     Ambulatory Referral to Physical Therapy; Future      Workup:  - Neurologic assessment reveals unremarkable neurological exam.  - CT head without contrast 2024: No acute intracranial findings.  Partially empty sella and small lateral ventricles  - Due to increased frequency and severity of headaches and migraines I recommend further evaluation with MRI brain with and without contrast to rule out structural or treatable causes of symptoms  - PT  referral    Preventative:  - we discussed headache hygiene and lifestyle factors that may improve headaches  - Propranolol 80mg HS  - Currently on through other providers: None  - Past/ failed/contraindicated: Amitriptyline, Topamax (25mg BID)  - future options: Topamax, TCA/SNRI, Memantine, Diamox, CGRP med, botox    Acute:  - discussed not taking over-the-counter or prescription pain medications more than 3 days per week to prevent medication overuse/rebound headache  - Sumatriptan 100mg  - Currently on through other providers: None  - Past/ failed/contraindicated: None  - future options:  Triptan, prochlorperazine, Toradol IM or p.o., could consider trial of 5 days of Depakote 500 mg nightly or dexamethasone 2 mg daily for prolonged migraine, ubrelvy, reyvow, nurtec, zavzpret  Patient instructions   Additional Testing:   Neurodiagnostic workup: MRI Brain ordered    Referrals:  Physical therapy    Headache Calendar  Please maintain a headache calendar  Consider using phone applications such as Migraine Ramin or [employer] ([place]) Migraine Tracker    Headache/migraine treatment:   Acute medications (for immediate treatment of a headache):   It is ok to take ibuprofen, acetaminophen or naproxen (Advil, Tylenol,  Aleve, Excedrin) if they help your headaches you should limit these to No more than 2-3 times a week to avoid medication overuse/rebound headaches.     For your more moderate to severe migraines take this medication early  Imitrex (Sumatriptan) 100mg tabs - take one at the onset of headache. May repeat one time after 2 hours if pain has not resolved.   (Max 2 a day)     Prescription preventive medications for headaches/migraines   (to take every day to help prevent headaches - not to take at the time of headache):  Propranolol - 80mg daily  - Update in about 3-4 weeks    *Typically these types of medications take time until you see the benefit, although some may see improvement in days, often it may take weeks,  especially if the medication is being titrated up to a beneficial level. Please contact us if there are any concerns or questions regarding the medication.     Lifestyle Recommendations:  [x] SLEEP - Maintain a regular sleep schedule: Adults need at least 7-8 hours of uninterrupted a night. Maintain good sleep hygiene:  Going to bed and waking up at consistent times, avoiding excessive daytime naps, avoiding caffeinated beverages in the evening, avoid excessive stimulation in the evening and generally using bed primarily for sleeping.  One hour before bedtime would recommend turning lights down lower, decreasing your activity (may read quietly, listen to music at a low volume). When you get into bed, should eliminate all technology (no texting, emailing, playing with your phone, iPad or tablet in bed).  [x] HYDRATION - Maintain good hydration.  Drink  2L of fluid a day (4 typical small water bottles)  [x] DIET - Maintain good nutrition. In particular don't skip meals and try and eat healthy balanced meals regularly.  [x] TRIGGERS - Look for other triggers and avoid them: Limit caffeine to 1-2 cups a day or less. Avoid dietary triggers that you have noticed bring on your headaches (this could include aged cheese, peanuts, MSG, aspartame and nitrates).  [x] EXERCISE - physical exercise as we all know is good for you in many ways, and not only is good for your heart, but also is beneficial for your mental health, cognitive health and  chronic pain/headaches. I would encourage at the least 5 days of physical exercise weekly for at least 30 minutes.     Education and Follow-up  [x] Please call with any questions or concerns. Of course if any new concerning symptoms go to the emergency department.  [x] Follow up in 4 months  History of Present Illness:       Headaches started at what age? 22 years old  How often do the headaches occur?   - as of 10/28/2024: 16-17/30 (of those, about 10 can be more severe)  What time of  the day do the headaches start?  Morning  How long do the headaches last? Multiple days  Are you ever headache free? Yes    HIT-6 (60> Severe impact on life, 56-59 substantial impact on your life, 50-55 some impact, <49 little to no impact on your life.)  When you have headaches, how often is the pain severe?  Never (6), Rarely (8), Sometimes (10), Very often (11), Always (13)  2.    How often do headaches limit your ability to do usual daily activities including household work, work, school, or social activities?  Never (6), Rarely (8), Sometimes (10), Very often (11), Always (13)  3.    When you have a headache, how often do you wish you could lie down?  Never (6), Rarely (8), Sometimes (10), Very often (11), Always (13)  4.     In the past 4 weeks, how often have you felt too tired to do work or daily activities because of your headaches?  Never (6), Rarely (8), Sometimes (10), Very often (11), Always (13)  5.     In the past 4 weeks, how often have you felt fed up or irritated because of your headaches?  Never (6), Rarely (8), Sometimes (10), Very often (11), Always (13)  6.     In the past 4 weeks, how often did headaches limit your ability to concentrate on work or daily activities?  Never (6), Rarely (8), Sometimes (10), Very often (11), Always (13)  Total: 57    Aura/warning? No    Last eye exam: Never    Where is your headache located?   temporalis and occipitalis    Describe your usual headache  Pressure    What is the intensity of pain?   Worst 9/10, Average: 5-7/10    Associated symptoms:   [x] Nausea       [x] Vomiting  [x] Stiff or sore neck   [x] Photophobia     [x]Phonophobia  [x] Blurred vision   [x] Prefer quiet, dark room  [x] Light-headed or dizzy       What medications do you take or have you taken for your headaches?   ABORTIVE:    OTC medications: Advil (about 3-4 days per week)  Prescription: Sumatriptan    Past/ failed/contraindicated:  OTC medications: None  Prescription:  None    PREVENTIVE:   Propranolol    Past/ failed/contraindicated:  Amitriptyline, Topamax (25mg BID)    Alternative therapies used in the past for headaches? [] Massage   [] Physical therapy   [] Chiropractor [] Acupuncture [] Acupressure   [] CBT  [] Biofeedback  [x] None  Headache are worse with: [] Coughing, [] Sneezing, [] Bending over, [] Exertion, [x] Nothing in particular    Headache triggers:  Fatigue, over thinking    What time of the year do headaches occur more frequently? do not seem to be related to any time of the year  Have you seen someone else for headaches or pain? No  Have you had trigger point injection performed and how often? No  Have you had Botox injection performed and how often? No   Have you had epidural injections or transforaminal injections performed? No    Are you current pregnant or planning on getting pregnant? No    Have you ever had any Brain imaging? yes; I personally reviewed these images.  -CT head without contrast 4/17/2024: No acute intracranial findings.  Partially empty sella and small lateral ventricles  Pertinent labs: None    Sleep History  Prior Sleep study:  No    Is your sleep restful? Yes  Do you wake up with headaches? Yes  How many hours do you actually sleep? About 8 hours  Do you snore while asleep? No  Have you been told that you stop breathing during sleeping? No  Do you wake up tired in the morning? Yes, sometimes  Do you take frequent naps during the day? No    Psychiatric History:  Anxiety: No  Depression: No  Psychiatric Admissions: No  Follow with psychiatry/psychology: No    Lifestyle:  Physical activity: gym about 4 days per week  Water: about 2 bottles per day  Caffeine: about 1-2 cups of coffee per day    Pertinent family history:  Family history of headaches:  no known family members with significant headaches  Any family history of aneurysms - No    Pertinent social history:  Work: [employer]  Education: College  Lives with family    Illicit Drugs:  "denies  Alcohol/tobacco: Denies alcohol use, Denies tobacco use  Review of Systems:   Constitutional:  Negative for appetite change, fatigue and fever.   HENT: Negative.  Negative for hearing loss, tinnitus, trouble swallowing and voice change.    Eyes:  Positive for photophobia. Negative for pain and visual disturbance.   Respiratory: Negative.  Negative for shortness of breath.    Cardiovascular: Negative.  Negative for palpitations.   Gastrointestinal: Negative.  Negative for nausea and vomiting.   Endocrine: Negative.  Negative for cold intolerance.   Genitourinary: Negative.  Negative for dysuria, frequency and urgency.   Musculoskeletal:  Negative for back pain, gait problem, myalgias, neck pain and neck stiffness.   Skin: Negative.  Negative for rash.   Allergic/Immunologic: Negative.    Neurological:  Positive for dizziness and headaches (20-25/30 HA - 8 are more severe - 1-2/7 weekly Advil/Tylenol). Negative for tremors, seizures, syncope, facial asymmetry, speech difficulty, weakness, light-headedness and numbness.   Hematological: Negative.  Does not bruise/bleed easily.   Psychiatric/Behavioral: Negative.  Negative for confusion, hallucinations and sleep disturbance.    All other systems reviewed and are negative.  Objective:                                                                  Vitals:            Constitutional:  /78 (BP Location: Right arm, Patient Position: Sitting, Cuff Size: Standard)   Pulse 78   Temp 98.3 °F (36.8 °C) (Temporal)   Ht 5' 4\" (1.626 m)   Wt 71.2 kg (156 lb 14.4 oz)   LMP 09/10/2024   SpO2 98%   BMI 26.93 kg/m²   BP Readings from Last 3 Encounters:   10/28/24 110/78   10/09/24 118/72   04/17/24 124/78     Pulse Readings from Last 3 Encounters:   10/28/24 78   10/09/24 92   04/17/24 78         Well developed, well nourished, well groomed. No dysmorphic features.       HEENT:  Normocephalic atraumatic.   Oropharynx is clear and moist. No oral mucosal lesions. "   Chest:  Respirations regular and unlabored.    Cardiovascular:  Distal extremities warm without palpable edema or tenderness, no observed significant swelling.    Musculoskeletal:  (see below under neurologic exam for evaluation of motor function and gait)   Skin:  warm and dry, not diaphoretic. No apparent birthmarks or stigmata of neurocutaneous disease.   Psychiatric:  Normal behavior and appropriate affect     Neurological Examination:     Mental status/cognitive function:   Orientated to time, place and person. Recent and remote memory intact. Attention span and concentration as well as fund of knowledge are appropriate for age. Normal language and spontaneous speech.    Cranial Nerves:  II-visual fields full.   Fundi poorly visualized due to pupillary constriction  III, IV, VI-Pupils were equal, round, and reactive to light and accomodation. Extraocular movements were full and conjugate without nystagmus.  Conjugate gaze, normal smooth pursuits, normal saccades   V-facial sensation symmetric.    VII-facial expression symmetric, intact forehead wrinkle, strong eye closure, symmetric smile    VIII-hearing grossly intact bilaterally   IX, X-palate elevation symmetric, no dysarthria.   XI-shoulder shrug strength intact    XII-tongue protrusion midline.    Motor Exam: symmetric bulk and tone throughout, no pronator drift. Power/strength 5/5 bilateral upper and lower extremities, no atrophy, fasciculations or abnormal movements noted.   Sensory: grossly intact light touch in all extremities.   Reflexes: brachioradialis 2+, biceps 2+, knee 2+, ankle 2+ bilaterally. No ankle clonus  Coordination: Finger nose finger intact bilaterally, no apparent dysmetria, ataxia or tremor noted  Gait: steady casual and tandem gait.       I have spent 30 minutes with the patient today in which greater than 50% of this time was spent in counseling/coordination of care regarding Diagnostic results, Prognosis, Risks and benefits of tx  options, Patient and family education, Importance of tx compliance, Impressions, Documenting in the medical record, Reviewing / ordering tests, medicine, procedures  , and Obtaining or reviewing history  . I also spent 15 minutes non face to face for this patient the same day.     Activity Minutes   Precharting/reviewing 10   Patient care/counseling 30   Postcharting/care coordination 5     Voice recognition software was used in the generation of this note. There may be unintentional errors including grammatical errors, spelling errors, or pronoun errors.

## 2024-10-28 NOTE — PATIENT INSTRUCTIONS
Additional Testing:   Neurodiagnostic workup: MRI Brain ordered    Referrals:  Physical therapy    Headache Calendar  Please maintain a headache calendar  Consider using phone applications such as Migraine Ramin or [place] Migraine Tracker    Headache/migraine treatment:   Acute medications (for immediate treatment of a headache):   It is ok to take ibuprofen, acetaminophen or naproxen (Advil, Tylenol,  Aleve, Excedrin) if they help your headaches you should limit these to No more than 2-3 times a week to avoid medication overuse/rebound headaches.     For your more moderate to severe migraines take this medication early  Imitrex (Sumatriptan) 100mg tabs - take one at the onset of headache. May repeat one time after 2 hours if pain has not resolved.   (Max 2 a day)     Prescription preventive medications for headaches/migraines   (to take every day to help prevent headaches - not to take at the time of headache):  Propranolol - 80mg daily  - Update in about 3-4 weeks    *Typically these types of medications take time until you see the benefit, although some may see improvement in days, often it may take weeks, especially if the medication is being titrated up to a beneficial level. Please contact us if there are any concerns or questions regarding the medication.     Lifestyle Recommendations:  [x] SLEEP - Maintain a regular sleep schedule: Adults need at least 7-8 hours of uninterrupted a night. Maintain good sleep hygiene:  Going to bed and waking up at consistent times, avoiding excessive daytime naps, avoiding caffeinated beverages in the evening, avoid excessive stimulation in the evening and generally using bed primarily for sleeping.  One hour before bedtime would recommend turning lights down lower, decreasing your activity (may read quietly, listen to music at a low volume). When you get into bed, should eliminate all technology (no texting, emailing, playing with your phone, iPad or tablet in bed).  [x]  HYDRATION - Maintain good hydration.  Drink  2L of fluid a day (4 typical small water bottles)  [x] DIET - Maintain good nutrition. In particular don't skip meals and try and eat healthy balanced meals regularly.  [x] TRIGGERS - Look for other triggers and avoid them: Limit caffeine to 1-2 cups a day or less. Avoid dietary triggers that you have noticed bring on your headaches (this could include aged cheese, peanuts, MSG, aspartame and nitrates).  [x] EXERCISE - physical exercise as we all know is good for you in many ways, and not only is good for your heart, but also is beneficial for your mental health, cognitive health and  chronic pain/headaches. I would encourage at the least 5 days of physical exercise weekly for at least 30 minutes.     Education and Follow-up  [x] Please call with any questions or concerns. Of course if any new concerning symptoms go to the emergency department.  [x] Follow up in 4 months

## 2024-10-28 NOTE — PROGRESS NOTES
Review of Systems   Constitutional:  Negative for appetite change, fatigue and fever.   HENT: Negative.  Negative for hearing loss, tinnitus, trouble swallowing and voice change.    Eyes:  Positive for photophobia. Negative for pain and visual disturbance.   Respiratory: Negative.  Negative for shortness of breath.    Cardiovascular: Negative.  Negative for palpitations.   Gastrointestinal: Negative.  Negative for nausea and vomiting.   Endocrine: Negative.  Negative for cold intolerance.   Genitourinary: Negative.  Negative for dysuria, frequency and urgency.   Musculoskeletal:  Negative for back pain, gait problem, myalgias, neck pain and neck stiffness.   Skin: Negative.  Negative for rash.   Allergic/Immunologic: Negative.    Neurological:  Positive for dizziness and headaches (20-25/30 HA - 8 are more severe - 1-2/7 weekly Advil/Tylenol). Negative for tremors, seizures, syncope, facial asymmetry, speech difficulty, weakness, light-headedness and numbness.   Hematological: Negative.  Does not bruise/bleed easily.   Psychiatric/Behavioral: Negative.  Negative for confusion, hallucinations and sleep disturbance.    All other systems reviewed and are negative.

## 2024-10-28 NOTE — ASSESSMENT & PLAN NOTE
Ms. Mckeon is here today for evaluation of migraines that have been going on since her early 20s.  She has never been formally evaluated for them before.  She was recently started on propranolol and sumatriptan by her PCP so I will have her continue with those for the time being.  She will update me in approximately 3 to 4 weeks at which point we can consider increasing the dose of propranolol if needed.  I emphasized the importance of not taking abortive medication more than 2 to 3 days/week.  Due to headaches occasionally waking her up from sleep and her previous findings of empty sella, I suggested that we get an MRI of the brain for further evaluation.

## 2024-11-27 ENCOUNTER — HOSPITAL ENCOUNTER (OUTPATIENT)
Dept: MRI IMAGING | Facility: HOSPITAL | Age: 39
Discharge: HOME | End: 2024-11-27
Attending: STUDENT IN AN ORGANIZED HEALTH CARE EDUCATION/TRAINING PROGRAM
Payer: COMMERCIAL

## 2024-11-27 DIAGNOSIS — G43.709 CHRONIC MIGRAINE WITHOUT AURA WITHOUT STATUS MIGRAINOSUS, NOT INTRACTABLE: ICD-10-CM

## 2024-11-27 PROCEDURE — A9585 GADOBUTROL INJECTION: HCPCS | Performed by: FAMILY MEDICINE

## 2024-11-27 PROCEDURE — 70553 MRI BRAIN STEM W/O & W/DYE: CPT

## 2024-11-27 RX ORDER — GADOBUTROL 604.72 MG/ML
7 INJECTION INTRAVENOUS
Status: COMPLETED | OUTPATIENT
Start: 2024-11-27 | End: 2024-11-27

## 2024-11-27 RX ADMIN — GADOBUTROL 7 ML: 604.72 INJECTION INTRAVENOUS at 19:27

## 2024-12-01 ENCOUNTER — RESULTS FOLLOW-UP (OUTPATIENT)
Dept: NEUROLOGY | Facility: CLINIC | Age: 39
End: 2024-12-01

## 2024-12-01 DIAGNOSIS — R93.7 ABNORMAL MRI, CERVICAL SPINE: Primary | ICD-10-CM

## 2024-12-02 ENCOUNTER — TELEPHONE (OUTPATIENT)
Dept: NEUROLOGY | Facility: CLINIC | Age: 39
End: 2024-12-02

## 2024-12-02 NOTE — TELEPHONE ENCOUNTER
Called pt to inform them that location for appt on 2/10 to [facility]. Pt was agreeable to r/s on 2/12 at 11a. Reached out to Genoveva HORN to assist with scheduling.

## 2024-12-03 NOTE — TELEPHONE ENCOUNTER
Called and advised pt of all of the below. She verbalized understanding. Pt states that her MRI cervical spine is scheduled on 12/23/24 at 0930.    Pt states that she was having issue w/ her momondot. Sent email to pt so she can activate her [employer] account. If need assistance, can call [employer] at [phone] option 5. Pt verbalized understanding.

## 2024-12-03 NOTE — TELEPHONE ENCOUNTER
----- Message from Andrews Peters DO sent at 12/1/2024 10:10 AM EST -----  Please let patient know that MRI brain did not show any surprising findings, but we can discuss more at her follow-up. The portion of the cervical spine that was seen shows a possible abnormality, but it is difficult to say what it is or if it is just artifact. I will order an MRI of the cervical spine with contrast as recommended by radiology.

## 2024-12-23 ENCOUNTER — HOSPITAL ENCOUNTER (OUTPATIENT)
Dept: MRI IMAGING | Facility: HOSPITAL | Age: 39
Discharge: HOME | End: 2024-12-23
Attending: STUDENT IN AN ORGANIZED HEALTH CARE EDUCATION/TRAINING PROGRAM
Payer: COMMERCIAL

## 2024-12-23 DIAGNOSIS — R93.7 ABNORMAL MRI, CERVICAL SPINE: ICD-10-CM

## 2024-12-23 PROCEDURE — 72156 MRI NECK SPINE W/O & W/DYE: CPT

## 2024-12-23 PROCEDURE — A9585 GADOBUTROL INJECTION: HCPCS | Performed by: FAMILY MEDICINE

## 2024-12-23 RX ORDER — GADOBUTROL 604.72 MG/ML
7 INJECTION INTRAVENOUS
Status: COMPLETED | OUTPATIENT
Start: 2024-12-23 | End: 2024-12-23

## 2024-12-23 RX ADMIN — GADOBUTROL 7 ML: 604.72 INJECTION INTRAVENOUS at 13:31

## 2024-12-31 ENCOUNTER — RESULTS FOLLOW-UP (OUTPATIENT)
Dept: NEUROLOGY | Facility: CLINIC | Age: 39
End: 2024-12-31

## 2025-01-03 ENCOUNTER — TELEPHONE (OUTPATIENT)
Age: 40
End: 2025-01-03
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
## 2025-02-03 PROBLEM — G95.89 PERSISTENT CENTRAL CANAL SYRINX (HCC): Status: ACTIVE | Noted: 2025-02-03

## 2025-02-03 NOTE — ASSESSMENT & PLAN NOTE
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  No radiculopathy or myelopathy    Imaging:  MRI cervical spine w/wo 12/23/24: 1.  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 No abnormal enhancement. 2.  Mild degenerative change pronounced at level C5-6 as described    Plan:  Reviewed imaging with patient and Dr. Lopez.  Tiny syrinx versus prominent central canal incidentally noted with no abnormal enhancement.   No neurosurgical invention recommended  A prominent central canal can be a normal variant, likely congenital  This is unlikely to change or grow.    This would not cause symptoms of pain or radiculopathy, but rather potential myelopathy.  This is not likely contributing to her neck pain or migraines.  Continue to follow with neurology for headache management  Recommend interval scan in 6 months with MRI cervical spine with/without contrast to ensure stability to see AP.  If imaging stable at that time, anticipate following up on an as-needed basis.  Call sooner with any questions or concerns.    Orders:    Ambulatory referral to Physical Therapy; Future    MRI cervical spine w wo contrast; Future    BUN; Future    Creatinine, serum; Future

## 2025-02-05 ENCOUNTER — OFFICE VISIT (OUTPATIENT)
Dept: NEUROSURGERY | Facility: CLINIC | Age: 40
End: 2025-02-05
Payer: COMMERCIAL

## 2025-02-05 VITALS
RESPIRATION RATE: 20 BRPM | BODY MASS INDEX: 27.08 KG/M2 | TEMPERATURE: 98.2 F | OXYGEN SATURATION: 99 % | WEIGHT: 158.6 LBS | SYSTOLIC BLOOD PRESSURE: 118 MMHG | HEIGHT: 64 IN | HEART RATE: 84 BPM | DIASTOLIC BLOOD PRESSURE: 80 MMHG

## 2025-02-05 DIAGNOSIS — G95.89 PERSISTENT CENTRAL CANAL SYRINX (HCC): Primary | ICD-10-CM

## 2025-02-05 DIAGNOSIS — R93.7 ABNORMAL MRI, CERVICAL SPINE: ICD-10-CM

## 2025-02-05 DIAGNOSIS — G95.0 SYRINX OF SPINAL CORD (HCC): ICD-10-CM

## 2025-02-05 PROCEDURE — 99243 OFF/OP CNSLTJ NEW/EST LOW 30: CPT | Performed by: PHYSICIAN ASSISTANT

## 2025-02-05 NOTE — PROGRESS NOTES
Name: Dawit Mckeon      : 1985      MRN: [id number]  Encounter Provider: Rashid Lopez MD  Encounter Date: 2025   Encounter department: [facility] [place]  :  Assessment & Plan  Persistent central canal syrinx (HCC)  New evaluation of a cervical spine syrinx versus prominent central canal  Incidentally noted on MRI brain ordered by neurology for workup of headaches 2024 and confirmed on MRI C-spine imaging 2024  Complains of neck pain to the right shoulder contributing to migraines.  No radiculopathy or myelopathy    Imaging:  MRI cervical spine w/wo 24: 1.  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 No abnormal enhancement. 2.  Mild degenerative change pronounced at level C5-6 as described    Plan:  Reviewed imaging with patient and Dr. Lopez.  Tiny syrinx versus prominent central canal incidentally noted with no abnormal enhancement.   No neurosurgical invention recommended  A prominent central canal can be a normal variant, likely congenital  This is unlikely to change or grow.    This would not cause symptoms of pain or radiculopathy, but rather potential myelopathy.  This is not likely contributing to her neck pain or migraines.  Continue to follow with neurology for headache management  Recommend interval scan in 6 months with MRI cervical spine with/without contrast to ensure stability to see AP.  If imaging stable at that time, anticipate following up on an as-needed basis.  Call sooner with any questions or concerns.    Orders:    Ambulatory referral to Physical Therapy; Future    MRI cervical spine w wo contrast; Future    BUN; Future    Creatinine, serum; Future    Abnormal MRI, cervical spine    Orders:    Ambulatory referral to Neurosurgery    Syrinx of spinal cord (HCC)    Orders:    Ambulatory referral to Neurosurgery        History of Present Illness   40-year-old female seen for new evaluation of a cervical  spine syrinx versus prominent central canal.  This is incidentally noted on MRI of the brain ordered by neurology for workup of headaches in November 2024, and confirmed on MRI C-spine December 2024.  She complains of neck pain that will radiate up into her head and cause migraines.  When the migraines are bad she will have dizziness and blurry vision intermittently.  The neck pain is worse on the right and radiates to the right shoulder, no pain extending into the arm.  Right-hand-dominant.  Denies issues with fine motor skills, dropping objects.  No falls or BBI.        Review of Systems   Constitutional: Negative.    HENT: Negative.     Eyes: Negative.    Respiratory: Negative.     Cardiovascular: Negative.    Gastrointestinal: Negative.    Endocrine: Negative.    Genitourinary: Negative.    Musculoskeletal:  Positive for neck pain (base of neck radiates to right shoulder down the arm) and neck stiffness (limited motion).   Allergic/Immunologic: Negative.    Neurological:  Positive for headaches (3-4 days a week). Negative for weakness.   Hematological: Negative.    Psychiatric/Behavioral:  Positive for sleep disturbance (feeling pressure in head when sleeping).     I have personally reviewed the MA's review of systems and made changes as necessary.    Past Medical History   History reviewed. No pertinent past medical history.  History reviewed. No pertinent surgical history.  Family History   Problem Relation Age of Onset    Diabetes Mother     Diabetes Father       reports that she has never smoked. She has never used smokeless tobacco. She reports that she does not drink alcohol and does not use drugs.  Current Outpatient Medications on File Prior to Visit   Medication Sig Dispense Refill    propranolol (INDERAL LA) 80 mg 24 hr capsule Take 1 capsule (80 mg total) by mouth daily 30 capsule 3    SUMAtriptan (Imitrex) 100 mg tablet Use 100mg at onset of migraine and  may repeat x 1 only in 2 hours. No more than  "2 tablets in 24 hours 10 tablet 6    fluticasone (FLONASE) 50 mcg/act nasal spray 2 sprays into each nostril daily (Patient not taking: Reported on 2/5/2025) 16 mL 1    Riboflavin 400 MG CAPS Take 1 capsule (400 mg total) by mouth daily before dinner (Patient not taking: Reported on 2/5/2025) 90 capsule 1     No current facility-administered medications on file prior to visit.     Allergies   Allergen Reactions    Pollen Extract Dermatitis    Seasonal Ic [Cholestatin] Itching     Eyes, nose ,and throat.      Social History     Tobacco Use    Smoking status: Never    Smokeless tobacco: Never   Vaping Use    Vaping status: Never Used   Substance and Sexual Activity    Alcohol use: Never    Drug use: Never    Sexual activity: Not Currently        Objective   /80 (BP Location: Left arm, Patient Position: Sitting, Cuff Size: Standard)   Pulse 84   Temp 98.2 °F (36.8 °C) (Temporal)   Resp 20   Ht 5' 4\" (1.626 m)   Wt 71.9 kg (158 lb 9.6 oz)   SpO2 99%   BMI 27.22 kg/m²     Physical Exam  Vitals reviewed.   Constitutional:       General: She is awake.      Appearance: Normal appearance.   HENT:      Head: Normocephalic and atraumatic.   Eyes:      Conjunctiva/sclera: Conjunctivae normal.   Neck:      Comments: Trapezius spasms bilaterally   Cardiovascular:      Rate and Rhythm: Normal rate.   Pulmonary:      Effort: Pulmonary effort is normal.   Skin:     General: Skin is warm and dry.   Neurological:      Mental Status: She is alert.      Deep Tendon Reflexes:      Reflex Scores:       Bicep reflexes are 2+ on the right side and 2+ on the left side.       Brachioradialis reflexes are 2+ on the right side and 2+ on the left side.       Patellar reflexes are 2+ on the right side and 2+ on the left side.  Psychiatric:         Attention and Perception: Attention and perception normal.         Mood and Affect: Mood and affect normal.         Speech: Speech normal.         Behavior: Behavior normal. Behavior is " cooperative.         Thought Content: Thought content normal.         Cognition and Memory: Cognition and memory normal.         Judgment: Judgment normal.       Neurological Exam  Mental Status  Awake and alert. Memory is normal. Speech is normal. Language is fluent with no aphasia. Attention and concentration are normal.    Motor  Normal muscle bulk throughout. Normal muscle tone. No pronator drift.                                             Right                     Left  Hip flexion                              5                          5  Knee flexion                           5                          5  Knee extension                      5                          5  Plantarflexion                         5                          5  Dorsiflexion                            5                          5                                             Right                     Left  Deltoid                                   5                          5   Biceps                                   5                          5   Triceps                                  5                          5   Wrist flexor                            5                          5   Wrist extensor                       5                          5   Interossei                              5                          5   5/5 bilaterally.    Sensory  Light touch is normal in upper and lower extremities.     Reflexes                                            Right                      Left  Brachioradialis                    2+                         2+  Biceps                                 2+                         2+  Patellar                                2+                         2+    Right pathological reflexes: Marco A's absent.  Left pathological reflexes: Marco A's absent.    Coordination  Right: Finger-to-nose normal.Left: Finger-to-nose normal.    Gait  Casual gait is normal including stance, stride, and arm  swing.

## 2025-02-07 ENCOUNTER — TELEPHONE (OUTPATIENT)
Dept: NEUROLOGY | Facility: CLINIC | Age: 40
End: 2025-02-07

## 2025-02-07 NOTE — TELEPHONE ENCOUNTER
Spoke to pt and confirmed their 11a   appt on   2/12  w/ Fran  . Reminded pt to arrive 15 mins prior to appt to check in with .

## 2025-02-12 ENCOUNTER — OFFICE VISIT (OUTPATIENT)
Dept: NEUROLOGY | Facility: CLINIC | Age: 40
End: 2025-02-12
Payer: COMMERCIAL

## 2025-02-12 VITALS
BODY MASS INDEX: 26.72 KG/M2 | OXYGEN SATURATION: 98 % | HEART RATE: 99 BPM | DIASTOLIC BLOOD PRESSURE: 88 MMHG | WEIGHT: 156.5 LBS | TEMPERATURE: 98.1 F | SYSTOLIC BLOOD PRESSURE: 128 MMHG | HEIGHT: 64 IN

## 2025-02-12 DIAGNOSIS — G43.709 CHRONIC MIGRAINE WITHOUT AURA WITHOUT STATUS MIGRAINOSUS, NOT INTRACTABLE: Primary | ICD-10-CM

## 2025-02-12 DIAGNOSIS — M54.2 CERVICALGIA: ICD-10-CM

## 2025-02-12 DIAGNOSIS — Z76.89 ENCOUNTER TO ESTABLISH CARE: ICD-10-CM

## 2025-02-12 DIAGNOSIS — G95.0 SYRINX OF SPINAL CORD (HCC): ICD-10-CM

## 2025-02-12 DIAGNOSIS — R41.3 MEMORY PROBLEM: ICD-10-CM

## 2025-02-12 DIAGNOSIS — R42 VERTIGO: ICD-10-CM

## 2025-02-12 PROCEDURE — 99214 OFFICE O/P EST MOD 30 MIN: CPT | Performed by: STUDENT IN AN ORGANIZED HEALTH CARE EDUCATION/TRAINING PROGRAM

## 2025-02-12 RX ORDER — PROPRANOLOL HYDROCHLORIDE 80 MG/1
80 CAPSULE, EXTENDED RELEASE ORAL
Qty: 30 CAPSULE | Refills: 3 | Status: SHIPPED | OUTPATIENT
Start: 2025-02-12

## 2025-02-12 NOTE — PROGRESS NOTES
Since your last visit are your headaches - Worsened    Any change to the headache type? No     What is your current headache frequency (total headache days out of 30): 16-20/30 (of those, how many are more severe: 16)    Are you taking your current medications as prescribed? yes      Do you have any side effects? No    How may days per week do you take an abortive medicine? 3/7  Advil

## 2025-02-12 NOTE — PROGRESS NOTES
Neurology Ambulatory Visit  Name: Dawit Mckeon       : 1985       MRN: [id number]   Encounter Provider: Andrews Peters DO   Encounter Date: 2025  Encounter department: [facility] [place]    Dawit Mckeon is a 39 y.o.  right handed female with a past medical history significant for chronic migraines, GERD who is returning to the Neurology office for evaluation of headaches.     Assessment and Plan  1. Chronic migraine without aura without status migrainosus, not intractable  Assessment & Plan:  At today's visit, she reports no improvement in terms of her headaches.  She is taking propranolol, but reports missing doses about 2-3 times per week.  From an abortive standpoint, sumatriptan is ineffective because she waits to take it.  She did not update me as requested with regards to the propranolol, so I did not adjust her dose.  I recommended that she try taking it nightly so that she does not forget to take it and update me in exactly 3 weeks at which time we can consider increasing the dose to 120 mg nightly.  Furthermore, I recommended that she get a dilated eye exam since she reports that headaches are worse when lying down.  She did not report this to me at our previous visit.  My suspicion for IIH remains low, but I think she would benefit from a thorough and dilated eye exam.  Outside of headaches, she describes a variety of other issues including dizziness at times that sounds like vertigo.  She also endorses some difficulty with her memory, but is nonspecific with it.  In terms of the vertigo, I recommended vestibular therapy.  With regards to her cognition, I suspect it is in the setting of mood and frequent headaches.  I recommended that she get some labs as below to start.  Orders:  -     propranolol (INDERAL LA) 80 mg 24 hr capsule; Take 1 capsule (80 mg total) by mouth daily at bedtime  2. Cervicalgia  3. Syrinx of spinal cord (HCC)  4. Encounter to establish care  -      Ambulatory Referral to Ophthalmology; Future  5. Vertigo  -     Ambulatory Referral to Physical Therapy; Future  6. Memory problem  -     Vitamin B12; Future  -     Folate; Future  -     TSH, 3rd generation with Free T4 reflex; Future  -     Vitamin B1, whole blood; Future      She will Return in about 6 months (around 8/12/2025).    Workup  - Neurologic assessment reveals unremarkable neurological exam.  - CT head without contrast 4/17/2024: No acute intracranial findings.  Partially empty sella and small lateral ventricles  - MRI brain with and without contrast 11/27/24: no acute intracranial findings. Nonspecific constellation of findings possibly related to IIH. Possibly syrinx in the cervical spinal cord  - MRI cervical spine with and without contrast 12/23/24: Mild degenerative changes. Dilated central canal or slit-like syrinx from C4-T1 (I have personally reviewed imaging and radiology read)  - Ophthalmology referral  - PT referral  - Labs: B1, B12, Folate, TSH     Preventative:  - we discussed headache hygiene and lifestyle factors that may improve headaches  - Propranolol 80mg HS  - Currently on through other providers: None  - Past/ failed/contraindicated: Amitriptyline, Topamax (25mg BID)  - future options: Topamax, TCA/SNRI, Memantine, Diamox, CGRP med, botox     Acute:  - discussed not taking over-the-counter or prescription pain medications more than 3 days per week to prevent medication overuse/rebound headache  - Sumatriptan 100mg  - Currently on through other providers: None  - Past/ failed/contraindicated: None  - future options:  Triptan, prochlorperazine, Toradol IM or p.o., could consider trial of 5 days of Depakote 500 mg nightly or dexamethasone 2 mg daily for prolonged migraine, ubrelvy, reyvow, nurtec, zavzpret    History of Present Illness       Interval updates:  2/12/25: Since our last visit, she was seen by neurosurgery for her suspected syrinx. They did not recommend any surgical  "intervention and suspected it would not cause any issues or change. Recommended repeat imaging in 6 months. At today's visit, she feels that her headaches have worsened in terms of intensity.  She currently endorses about 16-20 headache days per month of which 16 can be more severe.  She is taking propranolol 80 mg without any issues. She can miss it about 2-3 times per week.  From an abortive standpoint, sumatriptan is not effective because she waits to take it. She will use Advil about 1 hour after the Sumatriptan, but it is irritating her stomach. She notes that headaches are worse with lying down. She also describes vague episodes of dizziness. It can happen about 4 times per week and will last a few minutes. Not always associated with headache. Some room spinning component to it.    Prior History  10/28/24: Ms. Mckeon is here today for evaluation of migraines that have been going on since her early 20s.  She has never been formally evaluated for them before.  She was recently started on propranolol and sumatriptan by her PCP so I will have her continue with those for the time being.  She will update me in approximately 3 to 4 weeks at which point we can consider increasing the dose of propranolol if needed.  I emphasized the importance of not taking abortive medication more than 2 to 3 days/week.  Due to headaches occasionally waking her up from sleep and her previous findings of empty sella, I suggested that we get an MRI of the brain for further evaluation.         Objective     Physical Exam:                                                               Vitals:            Constitutional:    /88 (BP Location: Left arm, Patient Position: Sitting, Cuff Size: Standard)   Pulse 99   Temp 98.1 °F (36.7 °C) (Temporal)   Ht 5' 4\" (1.626 m)   Wt 71 kg (156 lb 8 oz)   SpO2 98%   BMI 26.86 kg/m²   BP Readings from Last 3 Encounters:   02/12/25 128/88   02/05/25 118/80   10/28/24 110/78     Pulse Readings " from Last 3 Encounters:   02/12/25 99   02/05/25 84   10/28/24 78         Well developed, well nourished, well groomed. No dysmorphic features.       HEENT:  Normocephalic atraumatic. See neuro exam   Chest:  Respirations appear regular and unlabored.    Cardiovascular:  no observed significant swelling.    Musculoskeletal:  (see below under neurologic exam for evaluation of motor function and gait)   Skin:  warm and dry, not diaphoretic.    Psychiatric:  Normal behavior and appropriate affect       Neurological Examination:     Mental status/cognitive function:   Recent and remote memory intact. Attention span and concentration as well as fund of knowledge are appropriate for age. Normal language and spontaneous speech.  Cranial Nerves:  III, IV, VI-Pupils were equal, round. Extraocular movements were full and conjugate   VII-facial expression symmetric  VIII-hearing grossly intact bilaterally   Motor Exam: symmetric bulk throughout. no atrophy, fasciculations or abnormal movements noted.   Coordination:  no apparent dysmetria, ataxia or tremor noted  Gait: steady casual gait      Voice recognition software was used in the generation of this note. There may be unintentional errors including grammatical errors, spelling errors, or pronoun errors.

## 2025-02-12 NOTE — PATIENT INSTRUCTIONS
Additional Testing  - Labs: B1, B12, Folate, TSH    Referral  Ophthalmology  Vestibular therapy    Headache Calendar  Please maintain a headache calendar  Consider using phone applications such as Migraine Ramin or [employer] Migraine Tracker    Headache/migraine treatment:   Acute medications (for immediate treatment of a headache):   It is ok to take ibuprofen, acetaminophen or naproxen (Advil, Tylenol,  Aleve, Excedrin) if they help your headaches you should limit these to No more than 2-3 times a week to avoid medication overuse/rebound headaches.     For your more moderate to severe migraines take this medication early  Imitrex (Sumatriptan) 100mg tabs - take one at the onset of headache. May repeat one time after 2 hours if pain has not resolved.   (Max 2 a day)     Prescription preventive medications for headaches/migraines   Propranolol - 80mg nightly  - Please update me in about 3 weeks    Lifestyle Recommendations:  [x] SLEEP - Maintain a regular sleep schedule: Adults need at least 7-8 hours of uninterrupted a night. Maintain good sleep hygiene:  Going to bed and waking up at consistent times, avoiding excessive daytime naps, avoiding caffeinated beverages in the evening, avoid excessive stimulation in the evening and generally using bed primarily for sleeping.  One hour before bedtime would recommend turning lights down lower, decreasing your activity (may read quietly, listen to music at a low volume). When you get into bed, should eliminate all technology (no texting, emailing, playing with your phone, iPad or tablet in bed).  [x] HYDRATION - Maintain good hydration.  Drink  2L of fluid a day (4 typical small water bottles)  [x] DIET - Maintain good nutrition. In particular don't skip meals and try and eat healthy balanced meals regularly.  [x] TRIGGERS - Look for other triggers and avoid them: Limit caffeine to 1-2 cups a day or less. Avoid dietary triggers that you have noticed bring on your headaches  (this could include aged cheese, peanuts, MSG, aspartame and nitrates).  [x] EXERCISE - physical exercise as we all know is good for you in many ways, and not only is good for your heart, but also is beneficial for your mental health, cognitive health and  chronic pain/headaches. I would encourage at the least 5 days of physical exercise weekly for at least 30 minutes.     Education and Follow-up  [x] Please call with any questions or concerns. Of course if any new concerning symptoms go to the emergency department.  [x] Follow up in 6 months

## 2025-02-12 NOTE — ASSESSMENT & PLAN NOTE
At today's visit, she reports no improvement in terms of her headaches.  She is taking propranolol, but reports missing doses about 2-3 times per week.  From an abortive standpoint, sumatriptan is ineffective because she waits to take it.  She did not update me as requested with regards to the propranolol, so I did not adjust her dose.  I recommended that she try taking it nightly so that she does not forget to take it and update me in exactly 3 weeks at which time we can consider increasing the dose to 120 mg nightly.  Furthermore, I recommended that she get a dilated eye exam since she reports that headaches are worse when lying down.  She did not report this to me at our previous visit.  My suspicion for IIH remains low, but I think she would benefit from a thorough and dilated eye exam.  Outside of headaches, she describes a variety of other issues including dizziness at times that sounds like vertigo.  She also endorses some difficulty with her memory, but is nonspecific with it.  In terms of the vertigo, I recommended vestibular therapy.  With regards to her cognition, I suspect it is in the setting of mood and frequent headaches.  I recommended that she get some labs as below to start.

## 2025-02-18 ENCOUNTER — EVALUATION (OUTPATIENT)
Dept: PHYSICAL THERAPY | Facility: CLINIC | Age: 40
End: 2025-02-18
Payer: COMMERCIAL

## 2025-02-18 DIAGNOSIS — G43.709 CHRONIC MIGRAINE WITHOUT AURA WITHOUT STATUS MIGRAINOSUS, NOT INTRACTABLE: ICD-10-CM

## 2025-02-18 DIAGNOSIS — M54.2 CERVICALGIA: Primary | ICD-10-CM

## 2025-02-18 DIAGNOSIS — G95.89 PERSISTENT CENTRAL CANAL SYRINX (HCC): ICD-10-CM

## 2025-02-18 DIAGNOSIS — M25.511 ACUTE PAIN OF RIGHT SHOULDER: ICD-10-CM

## 2025-02-18 PROCEDURE — 97161 PT EVAL LOW COMPLEX 20 MIN: CPT

## 2025-02-18 PROCEDURE — 97110 THERAPEUTIC EXERCISES: CPT

## 2025-02-18 NOTE — PROGRESS NOTES
PT Evaluation     Today's date: 2025  Patient name: Dawit Mcekon  : 1985  MRN: [id number]  Referring provider: Marycruz Farr PA-C  Dx:   Encounter Diagnosis     ICD-10-CM    1. Persistent central canal syrinx (HCC)  G95.89 Ambulatory referral to Physical Therapy          Start Time: 0810  Stop Time: 0900  Total time in clinic (min): 50 minutes    Assessment  Impairments: abnormal or restricted ROM, activity intolerance, impaired physical strength, lacks appropriate home exercise program, pain with function, participation limitations, activity limitations and endurance    Assessment details: Problem List:  1) Cervical spine hypomobility  2) R shoulder hypomobility  3) R shoulder strength impairment    Dawit Mckeon is a pleasant 40 y.o. female who presents with neck pain/stiffness, headache, dizziness, and R shoulder pain. 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 Upper quarter screen negative for UMNL. No further referral appears necessary at this time based upon examination results.  I expect she will improve with cervical spine mobility exercises, R shoulder mobility exercises, and UE strengthening.  Neck pain, headache, and dizziness reduced with repeated cervical spine retraction, R shoulder pain did not change. R shoulder pain reduced with repeated extension. Included cervical spine retraction for HEP; 10-15 repetitions 4-5 times per day. All treatments tolerated well, no adverse response.    Comparable signs:  1) Reaching overhead  2) Reaching behind back  3) Neck AROM    Goals  1. Pt will perform prescribed HEP independently.  2. Pt will be able to manage symptoms independently.  3. Pt will report 75% or greater decrease in headache  frequency and severity  4. Pt will report ability to work a full day with no increase in neck and shoulder pain.  5. Pt will demo ability to reach behind her back with RUE without shoulder pain.      Plan  Patient would benefit from: skilled physical therapy and home program  Planned modality interventions: biofeedback, thermotherapy: hydrocollator packs, cryotherapy and unattended electrical stimulation    Planned therapy interventions: abdominal trunk stabilization, joint mobilization, manual therapy, neuromuscular re-education, strengthening, flexibility, stretching, therapeutic activities, functional ROM exercises, therapeutic exercise and home exercise program    Frequency: 1-2x week  Duration in weeks: 12  Plan of Care beginning date: 2/18/2025  Plan of Care expiration date: 5/13/2025  Treatment plan discussed with: patient      Subjective Evaluation    History of Present Illness  Mechanism of injury: Patient is a 40 y.o. Female who reports to OP PT for evaluation and treatment of neck pain/stiffness, dizziness, headache, and R shoulder pain. She reports that her neck is stiffness, dizziness, and headache correlate with each other. Headache can be present on R or L temporal area or superior portion of the head. Also reports R shoulder pain on superior aspect of the shoulder. Neck stiffness is located diffusely bilaterally on midcervical spine to upper thoracic spine. Reports that symptoms are worst when she is tired and after she has done a lot of work; also worsens when she lays down. Reports that all symptoms are worst in the afternoon and before going to sleep. Easing factors include: medication.   Reports that R shoulder pain and stiffness has been present for 5-6 months, worsens with laying on R side. Reports that she has been doing light exercise for the shoulder, which has improved the pain.  Reports that she is a single mother to a 15 year old son and 13 year old daughter; works at a salon full  time.  Denies change in speech/swallowing, unexpected weight change, night pain, fever/sweats/chills, bowel/bladder changes, saddle paresthesia.  Pain  Current pain ratin  At best pain ratin  At worst pain ratin  Quality: pressure, dull ache, sharp and knife-like          Objective    Reflexes R L   C5-C6 (biceps) 2+ 2+   C5-C6 (brachioradialis) 2+ 2+   C6-C7 (triceps) 2+ 2+   Inverted supinator (-) (-)   Marco A's (-) (-)   Clonus (-) (-)     Dermatomes R L   C2 WNL WNL   C3 WNL WNL   C4 WNL WNL   C5 WNL WNL   C6 WNL WNL   C7 WNL WNL   C8 WNL WNL   T1 WNL WNL   T2 WNL WNL     AROM R L   Cervical spine flexion Limited 25%, pain CTJ   Cervical spine extension Limited 25%   Cervical spine rotation WNL Limited 25%, pain on L neck   Cervical spine side bend WNL WNL   Cervical spine protraction WNL, pain on CTJ   Cervical spine retraction Limited 25%, pain L temporal area      MMT R L   Shoulder flexion 4 5   Shoulder extension     Shoulder abduction 4-* 5   Shoulder adduction     Shoulder ER 4* 5   Shoulder IR 5 5   Elbow flexion 5 5   Elbow extension 5 5   Supination     Pronation     Wrist flexion 5 5   Wrist extension 5               5    Wrist radial deviation     Wrist ulnar deviation       AROM R L   Shoulder flexion 145* shoulder pain  160   Shoulder extension     Shoulder abduction 150* shoulder pain 180   Shoulder adduction     Shoulder ER 30* shoulder pain 85   Shoulder IR 20* shoulder pain 85   Elbow flexion     Elbow extension     Supination     Pronation     Wrist flexion     Wrist extension     Wrist radial deviation     Wrist ulnar deviation        Functional IR reach: limited 25% R, concordant shoulder pain, WNL L  Functional ER reach: Limited 25% R, WNL L    Repeated motions exam - Cervical  Retraction: produced, better (headache and neck pain down to 0/10; dizziness decreased)    Repeated motions exam - Shoulder  Extension: produced, better       Precautions: vertigo      Manuals                                                                  Neuro Re-Ed             Scapula retraction             Prone shoulder Y             Prone shoulder T                                                                 Ther Ex             Cx retraction Sitting  X10  2x10 w/ pt OP, x15 w/ pt OP            R shoulder extension             Row                                                                 Edu Re: objective findings, POC, HEP            Ther Activity                                       Gait Training                                       Modalities

## 2025-02-19 ENCOUNTER — APPOINTMENT (OUTPATIENT)
Dept: LAB | Facility: HOSPITAL | Age: 40
End: 2025-02-19
Attending: STUDENT IN AN ORGANIZED HEALTH CARE EDUCATION/TRAINING PROGRAM
Payer: COMMERCIAL

## 2025-02-19 DIAGNOSIS — R41.3 MEMORY PROBLEM: ICD-10-CM

## 2025-02-19 PROBLEM — Z00.00 ANNUAL PHYSICAL EXAM: Status: ACTIVE | Noted: 2025-02-19

## 2025-02-19 LAB — TSH SERPL DL<=0.05 MIU/L-ACNC: 1.09 UIU/ML (ref 0.45–4.5)

## 2025-02-19 PROCEDURE — 84443 ASSAY THYROID STIM HORMONE: CPT

## 2025-02-19 PROCEDURE — 82746 ASSAY OF FOLIC ACID SERUM: CPT

## 2025-02-19 PROCEDURE — 36415 COLL VENOUS BLD VENIPUNCTURE: CPT

## 2025-02-19 PROCEDURE — 82607 VITAMIN B-12: CPT

## 2025-02-19 PROCEDURE — 84425 ASSAY OF VITAMIN B-1: CPT

## 2025-02-19 NOTE — ASSESSMENT & PLAN NOTE
Check routine labs    Orders:  •  Comprehensive metabolic panel; Future  •  CBC and differential; Future  •  Lipid panel; Future

## 2025-02-19 NOTE — PROGRESS NOTES
Name: Dawit Mckeon      : 1985      MRN: [id number]  Encounter Provider: Sera Shelley MD  Encounter Date: 2025   Encounter department: [facility]  :  Assessment & Plan  Annual physical exam  Check routine labs    Orders:  •  Comprehensive metabolic panel; Future  •  CBC and differential; Future  •  Lipid panel; Future    Chronic migraine without aura without status migrainosus, not intractable  Reviewed neurology note  recommended dilated eye exam for IHP Uses imitrex prn       Need for hepatitis C screening test    Orders:  •  Hepatitis C Antibody; Future    Persistent central canal syrinx (HCC)  Neurosurgery note reviewed        Cervicalgia  Degenerative disc disease  pt going to PT          Sebaceous cyst  Large one mid back small one lower left back  to surgeon for removal   Orders:  •  Ambulatory Referral to General Surgery; Future    Encounter for annual routine gynecological examination    Orders:  •  Ambulatory Referral to Obstetrics / Gynecology; Future           History of Present Illness   Patient here for annual physical      Review of Systems   Constitutional:  Negative for appetite change, chills, fatigue and fever.   Respiratory:  Negative for cough, chest tightness and shortness of breath.    Cardiovascular:  Negative for chest pain, palpitations and leg swelling.   Gastrointestinal:  Negative for abdominal pain, constipation, diarrhea, nausea and vomiting.   Genitourinary:  Negative for difficulty urinating and frequency.   Musculoskeletal:  Negative for arthralgias, back pain, gait problem and neck pain.   Skin:  Negative for rash.   Neurological:  Negative for dizziness, weakness, light-headedness, numbness and headaches.   Hematological:  Does not bruise/bleed easily.   Psychiatric/Behavioral:  Negative for dysphoric mood and sleep disturbance. The patient is not nervous/anxious.        Objective   /72 (BP Location: Left arm, Patient  "Position: Sitting, Cuff Size: Standard)   Pulse 70   Temp 98.3 °F (36.8 °C) (Tympanic)   Resp 16   Ht 5' 4\" (1.626 m)   Wt 72.6 kg (160 lb)   SpO2 98%   BMI 27.46 kg/m²      Physical Exam  Vitals and nursing note reviewed.   Constitutional:       General: She is not in acute distress.     Appearance: Normal appearance. She is well-developed. She is obese.   HENT:      Head: Normocephalic and atraumatic.      Right Ear: Tympanic membrane, ear canal and external ear normal.      Left Ear: Tympanic membrane, ear canal and external ear normal.      Nose: Nose normal.      Mouth/Throat:      Mouth: Mucous membranes are moist.      Pharynx: Oropharynx is clear. No oropharyngeal exudate or posterior oropharyngeal erythema.   Eyes:      Extraocular Movements: Extraocular movements intact.      Conjunctiva/sclera: Conjunctivae normal.      Pupils: Pupils are equal, round, and reactive to light.   Cardiovascular:      Rate and Rhythm: Normal rate and regular rhythm.      Heart sounds: Normal heart sounds. No murmur heard.  Pulmonary:      Effort: Pulmonary effort is normal. No respiratory distress.      Breath sounds: Normal breath sounds. No wheezing or rales.   Chest:   Breasts:     Right: Normal. No swelling, bleeding, inverted nipple, mass, nipple discharge, skin change or tenderness.      Left: Normal. No swelling, bleeding, inverted nipple, mass, nipple discharge, skin change or tenderness.   Abdominal:      General: Bowel sounds are normal. There is no distension.      Palpations: Abdomen is soft. There is no mass.      Tenderness: There is no abdominal tenderness. There is no right CVA tenderness, left CVA tenderness, guarding or rebound.      Hernia: No hernia is present.   Musculoskeletal:         General: No swelling or tenderness. Normal range of motion.      Cervical back: Normal range of motion and neck supple.      Right lower leg: No edema.      Left lower leg: No edema.   Skin:     General: Skin is " warm and dry.      Capillary Refill: Capillary refill takes less than 2 seconds.      Findings: No rash.      Comments: 2 sebaceous cysts large mid back smaller left lower back    Neurological:      General: No focal deficit present.      Mental Status: She is alert and oriented to person, place, and time.      Cranial Nerves: No cranial nerve deficit.      Sensory: No sensory deficit.      Motor: No weakness.      Coordination: Coordination normal.      Gait: Gait normal.      Deep Tendon Reflexes: Reflexes normal.   Psychiatric:         Mood and Affect: Mood normal.         Behavior: Behavior normal.

## 2025-02-20 LAB
FOLATE SERPL-MCNC: 15.7 NG/ML
VIT B12 SERPL-MCNC: 277 PG/ML (ref 180–914)

## 2025-02-22 LAB — VIT B1 BLD-SCNC: 77.7 NMOL/L (ref 66.5–200)

## 2025-02-23 ENCOUNTER — RESULTS FOLLOW-UP (OUTPATIENT)
Dept: NEUROLOGY | Facility: CLINIC | Age: 40
End: 2025-02-23

## 2025-02-24 ENCOUNTER — OFFICE VISIT (OUTPATIENT)
Dept: FAMILY MEDICINE CLINIC | Facility: CLINIC | Age: 40
End: 2025-02-24
Payer: COMMERCIAL

## 2025-02-24 VITALS
RESPIRATION RATE: 16 BRPM | OXYGEN SATURATION: 98 % | BODY MASS INDEX: 27.31 KG/M2 | HEART RATE: 70 BPM | SYSTOLIC BLOOD PRESSURE: 118 MMHG | TEMPERATURE: 98.3 F | WEIGHT: 160 LBS | HEIGHT: 64 IN | DIASTOLIC BLOOD PRESSURE: 72 MMHG

## 2025-02-24 DIAGNOSIS — Z00.00 ANNUAL PHYSICAL EXAM: Primary | ICD-10-CM

## 2025-02-24 DIAGNOSIS — G43.709 CHRONIC MIGRAINE WITHOUT AURA WITHOUT STATUS MIGRAINOSUS, NOT INTRACTABLE: ICD-10-CM

## 2025-02-24 DIAGNOSIS — G95.89 PERSISTENT CENTRAL CANAL SYRINX (HCC): ICD-10-CM

## 2025-02-24 DIAGNOSIS — Z11.59 NEED FOR HEPATITIS C SCREENING TEST: ICD-10-CM

## 2025-02-24 DIAGNOSIS — M54.2 CERVICALGIA: ICD-10-CM

## 2025-02-24 DIAGNOSIS — Z01.419 ENCOUNTER FOR ANNUAL ROUTINE GYNECOLOGICAL EXAMINATION: ICD-10-CM

## 2025-02-24 DIAGNOSIS — L72.3 SEBACEOUS CYST: ICD-10-CM

## 2025-02-24 PROBLEM — Z76.89 ENCOUNTER TO ESTABLISH CARE: Status: RESOLVED | Noted: 2025-02-12 | Resolved: 2025-02-24

## 2025-02-24 PROCEDURE — 99395 PREV VISIT EST AGE 18-39: CPT | Performed by: FAMILY MEDICINE

## 2025-02-24 NOTE — ASSESSMENT & PLAN NOTE
Large one mid back small one lower left back  to surgeon for removal   Orders:  •  Ambulatory Referral to General Surgery; Future

## 2025-02-25 ENCOUNTER — OFFICE VISIT (OUTPATIENT)
Age: 40
End: 2025-02-25
Payer: COMMERCIAL

## 2025-02-25 ENCOUNTER — OFFICE VISIT (OUTPATIENT)
Dept: PHYSICAL THERAPY | Facility: CLINIC | Age: 40
End: 2025-02-25
Payer: COMMERCIAL

## 2025-02-25 DIAGNOSIS — Z76.89 ENCOUNTER TO ESTABLISH CARE: ICD-10-CM

## 2025-02-25 DIAGNOSIS — G93.2 IIH (IDIOPATHIC INTRACRANIAL HYPERTENSION): Primary | ICD-10-CM

## 2025-02-25 DIAGNOSIS — M25.511 ACUTE PAIN OF RIGHT SHOULDER: ICD-10-CM

## 2025-02-25 DIAGNOSIS — H43.823 VITREOMACULAR ADHESION OF BOTH EYES: ICD-10-CM

## 2025-02-25 DIAGNOSIS — M54.2 CERVICALGIA: ICD-10-CM

## 2025-02-25 DIAGNOSIS — G95.89 PERSISTENT CENTRAL CANAL SYRINX (HCC): Primary | ICD-10-CM

## 2025-02-25 DIAGNOSIS — G43.709 CHRONIC MIGRAINE WITHOUT AURA WITHOUT STATUS MIGRAINOSUS, NOT INTRACTABLE: ICD-10-CM

## 2025-02-25 PROCEDURE — 92133 CPTRZD OPH DX IMG PST SGM ON: CPT | Performed by: OPHTHALMOLOGY

## 2025-02-25 PROCEDURE — 92134 CPTRZ OPH DX IMG PST SGM RTA: CPT | Performed by: OPHTHALMOLOGY

## 2025-02-25 PROCEDURE — 92004 COMPRE OPH EXAM NEW PT 1/>: CPT | Performed by: OPHTHALMOLOGY

## 2025-02-25 PROCEDURE — 97112 NEUROMUSCULAR REEDUCATION: CPT | Performed by: PHYSICAL THERAPIST

## 2025-02-25 PROCEDURE — 97110 THERAPEUTIC EXERCISES: CPT | Performed by: PHYSICAL THERAPIST

## 2025-02-25 NOTE — PROGRESS NOTES
Daily Note     Today's date: 2025  Patient name: Dawit Mckeon  : 1985  MRN: [id number]  Referring provider: Marycruz Farr PA-C  Dx:   Encounter Diagnosis     ICD-10-CM    1. Persistent central canal syrinx (HCC)  G95.89       2. Cervicalgia  M54.2       3. Acute pain of right shoulder  M25.511       4. Chronic migraine without aura without status migrainosus, not intractable  G43.709                      Subjective: Pt presents today stating that she is feeling better in her head and neck.  Mild symptoms currently.  Still having pain in R shoulder.        Objective: See treatment diary below  R shoulder Pain, and L head ache.  L Arm Pain to hand.  Pain with reaching R shoulder back.      Ret for hold HA improved.  Ret + Ext L UE symptoms improved.  NB with R shoulder.        Assessment:  Pt is responding to upper cervical intervention for head ache, utilized loading for time to assist.  Explained to perform t/o day, at minimum 4xs, more if symptoms present.  Perform when symptoms are present, perform when they are not.  Pt was in accord.  L UE symptoms appear to be referred from cervical spine.  Continue to monitor for the R UE n.v.       Precautions: vertigo      Manuals                                                                Neuro Re-Ed  Postural ed and progression x 10           Scapula retraction             Prone shoulder Y             Prone shoulder T                                                                 Ther Ex             Cx retraction Sitting  X10  2x10 w/ pt OP, x15 w/ pt OP Retraction 2 x 10, Sustained 2 x 1 min better.             R shoulder extension             Row                          Retraction + Ext  6 x 5                                     Edu Re: objective findings, POC, HEP            Ther Activity                                       Gait Training                                       Modalities

## 2025-02-25 NOTE — PROGRESS NOTES
Name: Dawit Mckeon      : 1985      MRN: [id number]  Encounter Provider: Batsheva Escobedo MD  Encounter Date: 2025   Encounter department: [facility] OPHTHALMOLOGY  :  Assessment & Plan  IIH (idiopathic intracranial hypertension)    Orders:    OCT, Optic Nerve - OU - Both Eyes  R/o IIH; Recommend monitoring; Pt has no evidence of ON swelling; Less likely;  Will defer to neurology for evaluation;   Vitreomacular adhesion of both eyes    Orders:    OCT, Retina - OU - Both Eyes  Pt has syneresis; No retinal tears, breaks, or detachments on dilated examination; Recommend monitoring; Retinal detachment precautions were discussed with the patient. The patient was instructed to call or return immediately for an increase in flashes of light, floaters (dark spots in vision), or curtaining of the visual field.     Encounter to establish care    Orders:    Ambulatory Referral to Ophthalmology  See above;     Dawit Mckeon is a 40 y.o. female who presents for an evaluation of headaches.  Referred by neurologist.    Had recent MRI and was told to follow up with ophthalmologist.  She has a history of migraines for many years, but has been having increased and worsening headaches.  Headaches worsen upon laying down.  She experiences headaches daily.    She has noticed her vision occasionally becomes blurry and she feels dizzy during these times.  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  Denies flashes of light other than when experiencing migraines.    Has been taking propranolol and sumatriptan - ran out of sumatriptan.     History obtained from: patient    Review of Systems  Medical History Reviewed by provider this encounter:  Tobacco  Allergies  Meds  Problems  Med Hx  Surg Hx  Fam Hx     .     Past Ocular History: migraines  Ocular Meds/Drops: none    Base Eye Exam       Visual Acuity (Snellen - Linear)         Right Left    Dist sc 20/20 20/20              Tonometry (Tonopen, 3:27 PM)         Right  Left    Pressure 13 15              Pupils         Pupils    Right PERRL    Left PERRL              Visual Fields         Left Right     Full Full              Extraocular Movement         Right Left     Full Full              Dilation       Both eyes: 1% Tropicamide @ 3:27 PM                  Slit Lamp and Fundus Exam       External Exam         Right Left    External Normal Normal              Slit Lamp Exam         Right Left    Lids/Lashes Normal Normal    Conjunctiva/Sclera White and quiet White and quiet    Cornea Clear Clear    Anterior Chamber Deep and quiet Deep and quiet    Iris Round and reactive Round and reactive    Lens Clear Clear    Anterior Vitreous No PVD, clear, no cell No PVD, clear, no cell              Fundus Exam         Right Left    Disc sharp, no pallor; no ON swelling sharp, no pallor; no ON swelling    C/D Ratio 0.15 0.15    Macula flat/no heme / good foveal reflex flat/no heme / good foveal reflex    Vessels normal in caliber normal in caliber    Periphery flat, no retinal tear or detachment flat, no retinal tear or detachment                      IMAGING:  OCT, Retina - OU - Both Eyes          Right Eye  Quality was good. Findings include (Vitreomacular adhesion).     Left Eye  Quality was good. Findings include (Vitreomacular adhesion).          OCT, Optic Nerve - OU - Both Eyes          Right Eye  Reliability was good. Temporal thickness was normal. Superior thickness was normal. Nasal thickness was normal. Inferior thickness was normal.     Left Eye  Reliability was good. Temporal thickness was normal. Superior thickness was normal. Nasal thickness was normal. Inferior thickness was normal.

## 2025-03-01 ENCOUNTER — APPOINTMENT (OUTPATIENT)
Dept: LAB | Facility: HOSPITAL | Age: 40
End: 2025-03-01
Attending: FAMILY MEDICINE
Payer: COMMERCIAL

## 2025-03-01 DIAGNOSIS — Z11.59 NEED FOR HEPATITIS C SCREENING TEST: ICD-10-CM

## 2025-03-01 DIAGNOSIS — Z00.00 ANNUAL PHYSICAL EXAM: ICD-10-CM

## 2025-03-01 LAB
ALBUMIN SERPL BCG-MCNC: 4 G/DL (ref 3.5–5)
ALP SERPL-CCNC: 43 U/L (ref 34–104)
ALT SERPL W P-5'-P-CCNC: 16 U/L (ref 7–52)
ANION GAP SERPL CALCULATED.3IONS-SCNC: 7 MMOL/L (ref 4–13)
AST SERPL W P-5'-P-CCNC: 18 U/L (ref 13–39)
BASOPHILS # BLD AUTO: 0.01 THOUSANDS/ÂΜL (ref 0–0.1)
BASOPHILS NFR BLD AUTO: 0 % (ref 0–1)
BILIRUB SERPL-MCNC: 0.53 MG/DL (ref 0.2–1)
BUN SERPL-MCNC: 13 MG/DL (ref 5–25)
CALCIUM SERPL-MCNC: 9.2 MG/DL (ref 8.4–10.2)
CHLORIDE SERPL-SCNC: 105 MMOL/L (ref 96–108)
CHOLEST SERPL-MCNC: 155 MG/DL (ref ?–200)
CO2 SERPL-SCNC: 27 MMOL/L (ref 21–32)
CREAT SERPL-MCNC: 0.58 MG/DL (ref 0.6–1.3)
EOSINOPHIL # BLD AUTO: 0.14 THOUSAND/ÂΜL (ref 0–0.61)
EOSINOPHIL NFR BLD AUTO: 3 % (ref 0–6)
ERYTHROCYTE [DISTWIDTH] IN BLOOD BY AUTOMATED COUNT: 12.8 % (ref 11.6–15.1)
GFR SERPL CREATININE-BSD FRML MDRD: 116 ML/MIN/1.73SQ M
GLUCOSE P FAST SERPL-MCNC: 102 MG/DL (ref 65–99)
HCT VFR BLD AUTO: 37.6 % (ref 34.8–46.1)
HDLC SERPL-MCNC: 55 MG/DL
HGB BLD-MCNC: 11.9 G/DL (ref 11.5–15.4)
IMM GRANULOCYTES # BLD AUTO: 0.01 THOUSAND/UL (ref 0–0.2)
IMM GRANULOCYTES NFR BLD AUTO: 0 % (ref 0–2)
LDLC SERPL CALC-MCNC: 89 MG/DL (ref 0–100)
LYMPHOCYTES # BLD AUTO: 2.47 THOUSANDS/ÂΜL (ref 0.6–4.47)
LYMPHOCYTES NFR BLD AUTO: 48 % (ref 14–44)
MCH RBC QN AUTO: 29.5 PG (ref 26.8–34.3)
MCHC RBC AUTO-ENTMCNC: 31.6 G/DL (ref 31.4–37.4)
MCV RBC AUTO: 93 FL (ref 82–98)
MONOCYTES # BLD AUTO: 0.41 THOUSAND/ÂΜL (ref 0.17–1.22)
MONOCYTES NFR BLD AUTO: 8 % (ref 4–12)
NEUTROPHILS # BLD AUTO: 2.07 THOUSANDS/ÂΜL (ref 1.85–7.62)
NEUTS SEG NFR BLD AUTO: 41 % (ref 43–75)
NONHDLC SERPL-MCNC: 100 MG/DL
NRBC BLD AUTO-RTO: 0 /100 WBCS
PLATELET BLD QL SMEAR: ABNORMAL
PLATELET CLUMP BLD QL SMEAR: PRESENT
PMV BLD AUTO: 11.6 FL (ref 8.9–12.7)
POTASSIUM SERPL-SCNC: 4.3 MMOL/L (ref 3.5–5.3)
PROT SERPL-MCNC: 7.1 G/DL (ref 6.4–8.4)
RBC # BLD AUTO: 4.04 MILLION/UL (ref 3.81–5.12)
SODIUM SERPL-SCNC: 139 MMOL/L (ref 135–147)
TRIGL SERPL-MCNC: 55 MG/DL (ref ?–150)
WBC # BLD AUTO: 5.11 THOUSAND/UL (ref 4.31–10.16)

## 2025-03-01 PROCEDURE — 36415 COLL VENOUS BLD VENIPUNCTURE: CPT

## 2025-03-01 PROCEDURE — 80061 LIPID PANEL: CPT

## 2025-03-01 PROCEDURE — 85025 COMPLETE CBC W/AUTO DIFF WBC: CPT

## 2025-03-01 PROCEDURE — 80053 COMPREHEN METABOLIC PANEL: CPT

## 2025-03-01 PROCEDURE — 86803 HEPATITIS C AB TEST: CPT

## 2025-03-02 ENCOUNTER — RESULTS FOLLOW-UP (OUTPATIENT)
Dept: FAMILY MEDICINE CLINIC | Facility: CLINIC | Age: 40
End: 2025-03-02

## 2025-03-02 LAB — HCV AB SER QL: REACTIVE

## 2025-03-03 ENCOUNTER — CONSULT (OUTPATIENT)
Dept: SURGERY | Facility: CLINIC | Age: 40
End: 2025-03-03
Payer: COMMERCIAL

## 2025-03-03 VITALS
HEIGHT: 64 IN | TEMPERATURE: 96.4 F | DIASTOLIC BLOOD PRESSURE: 80 MMHG | HEART RATE: 68 BPM | BODY MASS INDEX: 26.98 KG/M2 | SYSTOLIC BLOOD PRESSURE: 104 MMHG | WEIGHT: 158 LBS | OXYGEN SATURATION: 98 %

## 2025-03-03 DIAGNOSIS — L72.3 SEBACEOUS CYST: Primary | ICD-10-CM

## 2025-03-03 PROCEDURE — 99244 OFF/OP CNSLTJ NEW/EST MOD 40: CPT | Performed by: SURGERY

## 2025-03-03 NOTE — PROGRESS NOTES
Name: Dawit Mckeon      : 1985      MRN: [id number]  Encounter Provider: Brodie Valdez MD  Encounter Date: 3/3/2025   Encounter department: [facility] [place]  :  Assessment & Plan  Sebaceous cyst    Orders:    Ambulatory Referral to General Surgery    Case request operating room: EXCISION  BIOPSY LESION/MASS BACK x2, EXCISION BIOPSY TISSUE LESION/MASS LOWER EXTREMITY; Standing    She has multiple cyst on her body.  I am scheduling her for excision of the cysts on April 10, 2025 under general anesthesia.  She signed the consent.    History of Present Illness   40-year-old female patient came to my office with complaints of multiple cysts.  She says 2 of them are on her back and one is on the left upper inner thigh.  She says that they have been there for years and are now getting bigger.  She has tried to squeeze out the cyst on her left thigh multiple times.  No drainage right now.  No problem with pain.      Dawit Mckeon is a 40 y.o. female who presents   History obtained from: patient    Review of Systems   Constitutional: Negative.    All other systems reviewed and are negative.    Medical History Reviewed by provider this encounter:  Tobacco  Allergies  Meds  Problems  Med Hx  Surg Hx  Fam Hx     .  Past Medical History   History reviewed. No pertinent past medical history.  History reviewed. No pertinent surgical history.  Family History   Problem Relation Age of Onset    Diabetes Mother     Diabetes Father       reports that she has never smoked. She has never used smokeless tobacco. She reports that she does not drink alcohol and does not use drugs.  Current Outpatient Medications   Medication Instructions    propranolol (INDERAL LA) 80 mg, Oral, Daily at bedtime    SUMAtriptan (Imitrex) 100 mg tablet Use 100mg at onset of migraine and  may repeat x 1 only in 2 hours. No more than 2 tablets in 24 hours     Allergies   Allergen Reactions    Pollen Extract Dermatitis     "Seasonal Ic [Cholestatin] Itching     Eyes, nose ,and throat.      Current Outpatient Medications on File Prior to Visit   Medication Sig Dispense Refill    propranolol (INDERAL LA) 80 mg 24 hr capsule Take 1 capsule (80 mg total) by mouth daily at bedtime 30 capsule 3    SUMAtriptan (Imitrex) 100 mg tablet Use 100mg at onset of migraine and  may repeat x 1 only in 2 hours. No more than 2 tablets in 24 hours 10 tablet 6     No current facility-administered medications on file prior to visit.      Social History     Tobacco Use    Smoking status: Never    Smokeless tobacco: Never   Vaping Use    Vaping status: Never Used   Substance and Sexual Activity    Alcohol use: Never    Drug use: Never    Sexual activity: Not Currently        Objective   /80 (BP Location: Left arm, Patient Position: Sitting, Cuff Size: Standard)   Pulse 68   Temp (!) 96.4 °F (35.8 °C) (Tympanic)   Ht 5' 4\" (1.626 m)   Wt 71.7 kg (158 lb)   SpO2 98%   BMI 27.12 kg/m²      Physical Exam  Vitals reviewed.   Constitutional:       Appearance: Normal appearance.   HENT:      Head: Normocephalic and atraumatic.      Nose: Nose normal.   Eyes:      Extraocular Movements: Extraocular movements intact.      Pupils: Pupils are equal, round, and reactive to light.   Cardiovascular:      Rate and Rhythm: Normal rate and regular rhythm.   Pulmonary:      Effort: Pulmonary effort is normal.      Breath sounds: Normal breath sounds.   Abdominal:      General: Bowel sounds are normal. There is no distension.      Palpations: Abdomen is soft. There is no mass.      Tenderness: There is no abdominal tenderness.      Hernia: No hernia is present.   Musculoskeletal:         General: Normal range of motion.      Cervical back: Normal range of motion.        Back:         Legs:       Comments: There are 2 sebaceous cyst on her back.  One measures 3 cm.  The lower 1 measures 2 cm.    There is a 3 to 4 cm sebaceous cyst left upper inner thigh.  It has some " scarring of the overlying skin which shows that there was some drainage in the past.   Neurological:      Mental Status: She is alert.

## 2025-03-03 NOTE — ASSESSMENT & PLAN NOTE
Orders:    Ambulatory Referral to General Surgery    Case request operating room: EXCISION  BIOPSY LESION/MASS BACK x2, EXCISION BIOPSY TISSUE LESION/MASS LOWER EXTREMITY; Standing

## 2025-03-04 ENCOUNTER — OFFICE VISIT (OUTPATIENT)
Dept: PHYSICAL THERAPY | Facility: CLINIC | Age: 40
End: 2025-03-04
Payer: COMMERCIAL

## 2025-03-04 DIAGNOSIS — R76.8 ABNORMAL HEPATITIS SEROLOGY: Primary | ICD-10-CM

## 2025-03-04 DIAGNOSIS — M54.2 CERVICALGIA: ICD-10-CM

## 2025-03-04 DIAGNOSIS — M25.511 ACUTE PAIN OF RIGHT SHOULDER: ICD-10-CM

## 2025-03-04 DIAGNOSIS — G43.709 CHRONIC MIGRAINE WITHOUT AURA WITHOUT STATUS MIGRAINOSUS, NOT INTRACTABLE: ICD-10-CM

## 2025-03-04 DIAGNOSIS — G95.89 PERSISTENT CENTRAL CANAL SYRINX (HCC): Primary | ICD-10-CM

## 2025-03-04 PROCEDURE — 97110 THERAPEUTIC EXERCISES: CPT

## 2025-03-04 NOTE — PROGRESS NOTES
Daily Note     Today's date: 3/4/2025  Patient name: Dawit Mckeon  : 1985  MRN: [id number]  Referring provider: Marycruz Farr PA-C  Dx:   Encounter Diagnosis     ICD-10-CM    1. Persistent central canal syrinx (HCC)  G95.89       2. Cervicalgia  M54.2       3. Acute pain of right shoulder  M25.511       4. Chronic migraine without aura without status migrainosus, not intractable  G43.709           Start Time: 738  Stop Time: 801  Total time in clinic (min): 23 minutes    Subjective: Reports that her neck pain, dizziness, and headache have improved significantly; had migraine twice in the past week and dizziness/headache once. 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 She experiences pain with hz adduction and reaching backwards overhead.      Objective: See treatment diary below  9/10 pain with hz abd and 90/90 overhead reach  No improvement with     Assessment: Pt has lasting improvement in headache, dizziness, and neck pain with retraction+extension exercise. R shoulder pain has not improved with cervical spine treatment; pain improved significantly with repeated flexion. Provided this for HEP. All treatments tolerated well, no adverse response.      Plan: Continue per plan of care.      Precautions: vertigo      Manuals  3/4                                                              Neuro Re-Ed  Postural ed and progression x 10           Scapula retraction             Prone shoulder Y             Prone shoulder T                                                                 Ther Ex             Cx retraction Sitting  X10  2x10 w/ pt OP, x15 w/ pt OP Retraction 2 x 10, Sustained 2 x 1 min better.             R shoulder extension   Repeated, 2x10 w/ IR bias, x10 w/ ER bias          R shoulder flexion   Repeated x10, x15          Row   10# x15 (produced R shoulder pain)                       Retraction + Ext  6 x 5                                      Edu Re: objective findings, POC, HEP            Ther Activity                                       Gait Training                                       Modalities

## 2025-03-06 ENCOUNTER — APPOINTMENT (OUTPATIENT)
Dept: LAB | Facility: HOSPITAL | Age: 40
End: 2025-03-06
Attending: FAMILY MEDICINE
Payer: COMMERCIAL

## 2025-03-06 DIAGNOSIS — G43.709 CHRONIC MIGRAINE WITHOUT AURA WITHOUT STATUS MIGRAINOSUS, NOT INTRACTABLE: ICD-10-CM

## 2025-03-06 DIAGNOSIS — R76.8 ABNORMAL HEPATITIS SEROLOGY: ICD-10-CM

## 2025-03-06 PROCEDURE — 36415 COLL VENOUS BLD VENIPUNCTURE: CPT

## 2025-03-06 PROCEDURE — 87522 HEPATITIS C REVRS TRNSCRPJ: CPT

## 2025-03-06 PROCEDURE — 87521 HEPATITIS C PROBE&RVRS TRNSC: CPT

## 2025-03-06 RX ORDER — SUMATRIPTAN SUCCINATE 100 MG/1
TABLET ORAL
Qty: 10 TABLET | Refills: 2 | Status: SHIPPED | OUTPATIENT
Start: 2025-03-06

## 2025-03-06 RX ORDER — PROPRANOLOL HYDROCHLORIDE 80 MG/1
80 CAPSULE, EXTENDED RELEASE ORAL
Qty: 30 CAPSULE | Refills: 0 | OUTPATIENT
Start: 2025-03-06

## 2025-03-06 NOTE — PROGRESS NOTES
Pre-charting for Appointment      Reason for being seen today: pap done on 08/01/24 - pt here for menstrual problem     Any current testing/imaging done prior to exam today:

## 2025-03-07 ENCOUNTER — ANNUAL EXAM (OUTPATIENT)
Dept: OBGYN CLINIC | Facility: CLINIC | Age: 40
End: 2025-03-07
Payer: COMMERCIAL

## 2025-03-07 VITALS
WEIGHT: 155.2 LBS | DIASTOLIC BLOOD PRESSURE: 70 MMHG | SYSTOLIC BLOOD PRESSURE: 122 MMHG | HEIGHT: 64 IN | BODY MASS INDEX: 26.5 KG/M2

## 2025-03-07 DIAGNOSIS — Z01.419 ENCOUNTER FOR ANNUAL ROUTINE GYNECOLOGICAL EXAMINATION: Primary | ICD-10-CM

## 2025-03-07 DIAGNOSIS — N91.2 AMENORRHEA: ICD-10-CM

## 2025-03-07 LAB — HCV RNA SERPL NAA+PROBE-ACNC: NOT DETECTED K[IU]/ML

## 2025-03-07 PROCEDURE — 99385 PREV VISIT NEW AGE 18-39: CPT | Performed by: OBSTETRICS & GYNECOLOGY

## 2025-03-07 NOTE — PROGRESS NOTES
"Catalino Mckeon is a 40 y.o. female who presents for problem with her menses . Periods are irregular,/oligomenorrhea lasting 4 days. No intermenstrual bleeding, spotting, or discharge.  Oligomenorrhea usually have 2 menses per year  2    Current contraception: abstinence  History of abnormal Pap smear: no  Family history of uterine or ovarian cancer: no  Family history of breast cancer: no    Menstrual History:  OB History          2    Para   2    Term   2            AB        Living   2         SAB        IAB        Ectopic        Multiple        Live Births                      Patient's last menstrual period was 2024 (exact date).  Period Duration (Days): 7  Period Pattern: (!) Irregular  Menstrual Flow: Moderate  Dysmenorrhea: None    The following portions of the patient's history were reviewed and updated as appropriate: allergies, current medications, past family history, past medical history, past social history, past surgical history, and problem list.    Review of Systems  Review of Systems   Constitutional:  Negative for activity change, appetite change, chills, fatigue and fever.   Respiratory:  Negative for apnea, cough, chest tightness and shortness of breath.    Cardiovascular:  Negative for chest pain, palpitations and leg swelling.   Gastrointestinal:  Negative for abdominal pain, constipation, diarrhea, nausea and vomiting.   Genitourinary:  Negative for difficulty urinating, dysuria, flank pain, frequency, hematuria and urgency.   Neurological:  Negative for dizziness, seizures, syncope, light-headedness, numbness and headaches.   Psychiatric/Behavioral:  Negative for agitation and confusion.           Objective      /70 (BP Location: Left arm, Patient Position: Sitting, Cuff Size: Adult)   Ht 5' 4\" (1.626 m)   Wt 70.4 kg (155 lb 3.2 oz)   LMP 2024 (Exact Date)   BMI 26.64 kg/m²     Physical Exam  OBGyn Exam     General:   alert and oriented, " in no acute distress, alert, appears stated age, and cooperative   Heart: regular rate and rhythm, S1, S2 normal, no murmur, click, rub or gallop   Lungs: clear to auscultation bilaterally   Abdomen: soft, non-tender, without masses or organomegaly   Vulva: normal   Vagina: normal mucosa, normal discharge   Cervix: no cervical motion tenderness and no lesions   Uterus: normal size   Adnexa:  Breast Exam:  normal adnexa  breasts appear normal, no suspicious masses, no skin or nipple changes or axillary nodes.                Assessment       40-year-old female  Abnormal uterine bleeding/oligomenorrhea  Prior vaginal delivery x 2  Migraine no aura  Not on contraception  Plan   Pap done  by PCP  2024 negative  Diet/exercise  Prenatal vitamin daily  Patient to go for lab to check for any endocrine abnormality if lab came back reassuring I would recommend Provera followed by menstrual chart if patient still have no menses recommend to do pregnancy test every 2 to 3 months if negative Provera will be considered every 2 to 3 months patient does not like to restart OCP  Plan explained and discussed with patient all patient questions answered and patient was satisfied     All questions answered.     There are no Patient Instructions on file for this visit.

## 2025-03-08 ENCOUNTER — APPOINTMENT (OUTPATIENT)
Dept: LAB | Facility: HOSPITAL | Age: 40
End: 2025-03-08
Attending: OBSTETRICS & GYNECOLOGY
Payer: COMMERCIAL

## 2025-03-08 DIAGNOSIS — N91.2 AMENORRHEA: ICD-10-CM

## 2025-03-08 LAB
B-HCG SERPL-ACNC: <0.6 MIU/ML (ref 0–5)
ESTRADIOL SERPL-MCNC: 67.4 PG/ML
FSH SERPL-ACNC: 5.7 MIU/ML
LH SERPL-ACNC: 13 MIU/ML
PROLACTIN SERPL-MCNC: 6.73 NG/ML (ref 3.34–26.72)
T4 FREE SERPL-MCNC: 1.08 NG/DL (ref 0.61–1.12)
TSH SERPL DL<=0.05 MIU/L-ACNC: 0.66 UIU/ML (ref 0.45–4.5)

## 2025-03-08 PROCEDURE — 84443 ASSAY THYROID STIM HORMONE: CPT

## 2025-03-08 PROCEDURE — 36415 COLL VENOUS BLD VENIPUNCTURE: CPT

## 2025-03-08 PROCEDURE — 83498 ASY HYDROXYPROGESTERONE 17-D: CPT

## 2025-03-08 PROCEDURE — 83001 ASSAY OF GONADOTROPIN (FSH): CPT

## 2025-03-08 PROCEDURE — 84702 CHORIONIC GONADOTROPIN TEST: CPT

## 2025-03-08 PROCEDURE — 83002 ASSAY OF GONADOTROPIN (LH): CPT

## 2025-03-08 PROCEDURE — 82670 ASSAY OF TOTAL ESTRADIOL: CPT

## 2025-03-08 PROCEDURE — 84146 ASSAY OF PROLACTIN: CPT

## 2025-03-08 PROCEDURE — 82627 DEHYDROEPIANDROSTERONE: CPT

## 2025-03-08 PROCEDURE — 84439 ASSAY OF FREE THYROXINE: CPT

## 2025-03-09 LAB — DHEA-S SERPL-MCNC: 155 UG/DL (ref 57.3–279.2)

## 2025-03-10 ENCOUNTER — HOSPITAL ENCOUNTER (EMERGENCY)
Facility: HOSPITAL | Age: 40
Discharge: HOME | End: 2025-03-10
Attending: EMERGENCY MEDICINE
Payer: COMMERCIAL

## 2025-03-10 ENCOUNTER — RESULTS FOLLOW-UP (OUTPATIENT)
Dept: OBGYN CLINIC | Facility: CLINIC | Age: 40
End: 2025-03-10

## 2025-03-10 VITALS
HEIGHT: 64 IN | SYSTOLIC BLOOD PRESSURE: 119 MMHG | RESPIRATION RATE: 18 BRPM | OXYGEN SATURATION: 97 % | HEART RATE: 66 BPM | BODY MASS INDEX: 26.63 KG/M2 | TEMPERATURE: 97.8 F | WEIGHT: 156 LBS | DIASTOLIC BLOOD PRESSURE: 78 MMHG

## 2025-03-10 DIAGNOSIS — G43.909 MIGRAINE HEADACHE: Primary | ICD-10-CM

## 2025-03-10 DIAGNOSIS — N91.3 PRIMARY OLIGOMENORRHEA: Primary | ICD-10-CM

## 2025-03-10 PROCEDURE — 96375 TX/PRO/DX INJ NEW DRUG ADDON: CPT

## 2025-03-10 PROCEDURE — 96365 THER/PROPH/DIAG IV INF INIT: CPT

## 2025-03-10 PROCEDURE — 99284 EMERGENCY DEPT VISIT MOD MDM: CPT | Performed by: EMERGENCY MEDICINE

## 2025-03-10 PROCEDURE — 99283 EMERGENCY DEPT VISIT LOW MDM: CPT

## 2025-03-10 PROCEDURE — 96366 THER/PROPH/DIAG IV INF ADDON: CPT

## 2025-03-10 RX ORDER — DIPHENHYDRAMINE HYDROCHLORIDE 50 MG/ML
25 INJECTION, SOLUTION INTRAMUSCULAR; INTRAVENOUS ONCE
Status: COMPLETED | OUTPATIENT
Start: 2025-03-10 | End: 2025-03-10

## 2025-03-10 RX ORDER — MEDROXYPROGESTERONE ACETATE 10 MG
10 TABLET ORAL DAILY
Qty: 5 TABLET | Refills: 2 | Status: SHIPPED | OUTPATIENT
Start: 2025-03-10

## 2025-03-10 RX ORDER — KETOROLAC TROMETHAMINE 30 MG/ML
30 INJECTION, SOLUTION INTRAMUSCULAR; INTRAVENOUS ONCE
Status: COMPLETED | OUTPATIENT
Start: 2025-03-10 | End: 2025-03-10

## 2025-03-10 RX ORDER — MAGNESIUM SULFATE HEPTAHYDRATE 40 MG/ML
2 INJECTION, SOLUTION INTRAVENOUS ONCE
Status: COMPLETED | OUTPATIENT
Start: 2025-03-10 | End: 2025-03-10

## 2025-03-10 RX ORDER — METOCLOPRAMIDE HYDROCHLORIDE 5 MG/ML
10 INJECTION INTRAMUSCULAR; INTRAVENOUS ONCE
Status: COMPLETED | OUTPATIENT
Start: 2025-03-10 | End: 2025-03-10

## 2025-03-10 RX ADMIN — SODIUM CHLORIDE 1000 ML: 0.9 INJECTION, SOLUTION INTRAVENOUS at 00:49

## 2025-03-10 RX ADMIN — KETOROLAC TROMETHAMINE 30 MG: 30 INJECTION, SOLUTION INTRAMUSCULAR at 00:44

## 2025-03-10 RX ADMIN — DIPHENHYDRAMINE HYDROCHLORIDE 25 MG: 50 INJECTION, SOLUTION INTRAMUSCULAR; INTRAVENOUS at 00:44

## 2025-03-10 RX ADMIN — MAGNESIUM SULFATE HEPTAHYDRATE 2 G: 40 INJECTION, SOLUTION INTRAVENOUS at 00:44

## 2025-03-10 RX ADMIN — METOCLOPRAMIDE 10 MG: 5 INJECTION, SOLUTION INTRAMUSCULAR; INTRAVENOUS at 00:44

## 2025-03-10 NOTE — ED PROVIDER NOTES
Time reflects when diagnosis was documented in both MDM as applicable and the Disposition within this note       Time User Action Codes Description Comment    3/10/2025  3:03 AM Nikita Ken Add [G43.909] Migraine headache           ED Disposition       ED Disposition   Discharge    Condition   Stable    Date/Time   Mon Mar 10, 2025  3:03 AM    Comment   Dawit Mike discharge to home/self care.                   Assessment & Plan   {Hyperlinks  Risk Stratification - NIHSS - HEART SCORE - Fill out sepsis note and make sure you call [phone] if severe or septic shock:[id number]}    Medical Decision Making  Risk  Prescription drug management.             Medications   ketorolac (TORADOL) injection 30 mg (30 mg Intravenous Given 3/10/25 0044)   metoclopramide (REGLAN) injection 10 mg (10 mg Intravenous Given 3/10/25 0044)   diphenhydrAMINE (BENADRYL) injection 25 mg (25 mg Intravenous Given 3/10/25 0044)   magnesium sulfate 2 g/50 mL IVPB (premix) 2 g (0 g Intravenous Stopped 3/10/25 0228)   sodium chloride 0.9 % bolus 1,000 mL (0 mL Intravenous Stopped 3/10/25 0228)       ED Risk Strat Scores                            SBIRT 20yo+      Flowsheet Row Most Recent Value   Initial Alcohol Screen: US AUDIT-C     1. How often do you have a drink containing alcohol? 0 Filed at: 03/10/2025 0015   2. How many drinks containing alcohol do you have on a typical day you are drinking?  0 Filed at: 03/10/2025 0015   3b. FEMALE Any Age, or MALE 65+: How often do you have 4 or more drinks on one occassion? 0 Filed at: 03/10/2025 0015   Audit-C Score 0 Filed at: 03/10/2025 0015   YANNI: How many times in the past year have you...    Used an illegal drug or used a prescription medication for non-medical reasons? Never Filed at: 03/10/2025 0015                            History of Present Illness   {Hyperlinks  History (Med, Surg, Fam, Social) - Current Medications - Allergies  :[id number]}    Chief Complaint   Patient presents with     Headache     Pt arrived ambulatory with c/o a headache, hx of same. Pt took some meds at home with no relief       Past Medical History:   Diagnosis Date    Migraines       History reviewed. No pertinent surgical history.   Family History   Problem Relation Age of Onset    Diabetes Mother     Diabetes Father       Social History     Tobacco Use    Smoking status: Never    Smokeless tobacco: Never   Vaping Use    Vaping status: Never Used   Substance Use Topics    Alcohol use: Never    Drug use: Never      E-Cigarette/Vaping    E-Cigarette Use Never User       E-Cigarette/Vaping Substances    Nicotine No     THC No     CBD No     Flavoring No     Other No     Unknown No       I have reviewed and agree with the history as documented.     41 y/o female with hx of migraines presents to the ED for evaluation of migraine headache that started at 1945 tonight.        Review of Systems   Constitutional:  Negative for chills and fever.   HENT:  Negative for congestion, rhinorrhea and sore throat.    Eyes:  Positive for photophobia.   Respiratory:  Negative for cough and shortness of breath.    Cardiovascular:  Negative for chest pain and palpitations.   Gastrointestinal:  Positive for nausea. Negative for abdominal pain, diarrhea and vomiting.   Genitourinary:  Negative for dysuria and hematuria.   Musculoskeletal:  Negative for back pain and neck pain.   Neurological:  Positive for headaches. Negative for dizziness, weakness, light-headedness and numbness.   All other systems reviewed and are negative.          Objective   {Hyperlinks  Historical Vitals - Historical Labs - Chart Review/Microbiology - Last Echo - Code Status  :[id number]}    ED Triage Vitals [03/10/25 0015]   Temperature Pulse Blood Pressure Respirations SpO2 Patient Position - Orthostatic VS   97.8 °F (36.6 °C) 66 119/78 18 97 % Lying      Temp Source Heart Rate Source BP Location FiO2 (%) Pain Score    Oral Monitor Right arm -- 10 - Worst Possible Pain       Vitals      Date and Time Temp Pulse SpO2 Resp BP Pain Score FACES Pain Rating User   03/10/25 0015 97.8 °F (36.6 °C) 66 97 % 18 119/78 10 - Worst Possible Pain -- NATE            Physical Exam  Vitals and nursing note reviewed.   Constitutional:       General: She is not in acute distress.     Appearance: Normal appearance. She is normal weight. She is not ill-appearing.   HENT:      Head: Normocephalic and atraumatic.      Right Ear: External ear normal.      Left Ear: External ear normal.      Nose: Nose normal. No congestion or rhinorrhea.      Mouth/Throat:      Mouth: Mucous membranes are moist.      Pharynx: Oropharynx is clear. No oropharyngeal exudate or posterior oropharyngeal erythema.   Eyes:      Extraocular Movements: Extraocular movements intact.      Conjunctiva/sclera: Conjunctivae normal.      Pupils: Pupils are equal, round, and reactive to light.   Cardiovascular:      Rate and Rhythm: Normal rate and regular rhythm.      Pulses: Normal pulses.      Heart sounds: Normal heart sounds. No murmur heard.  Pulmonary:      Effort: Pulmonary effort is normal. No respiratory distress.      Breath sounds: Normal breath sounds. No wheezing or rales.   Abdominal:      General: Abdomen is flat. Bowel sounds are normal. There is no distension.      Palpations: Abdomen is soft.      Tenderness: There is no abdominal tenderness. There is no right CVA tenderness, left CVA tenderness or guarding.   Musculoskeletal:         General: No swelling or tenderness. Normal range of motion.      Cervical back: Normal range of motion and neck supple. No tenderness.   Skin:     General: Skin is warm and dry.      Capillary Refill: Capillary refill takes less than 2 seconds.   Neurological:      General: No focal deficit present.      Mental Status: She is alert and oriented to person, place, and time.      Cranial Nerves: No cranial nerve deficit.      Sensory: No sensory deficit.      Motor: No weakness.         Results  Reviewed       None            No orders to display       Procedures    ED Medication and Procedure Management   Prior to Admission Medications   Prescriptions Last Dose Informant Patient Reported? Taking?   SUMAtriptan (Imitrex) 100 mg tablet   No No   Sig: Use 100mg at onset of migraine and  may repeat x 1 only in 2 hours. No more than 2 tablets in 24 hours   propranolol (INDERAL LA) 80 mg 24 hr capsule   No No   Sig: Take 1 capsule (80 mg total) by mouth daily at bedtime      Facility-Administered Medications: None     Patient's Medications   Discharge Prescriptions    No medications on file     No discharge procedures on file.  ED SEPSIS DOCUMENTATION   Time reflects when diagnosis was documented in both MDM as applicable and the Disposition within this note       Time User Action Codes Description Comment    3/10/2025  3:03 AM Ken Shelton Add [G43.909] Migraine headache                applicable and the Disposition within this note       Time User Action Codes Description Comment    3/10/2025  3:03 AM Ken Shelton Add [G43.909] Migraine headache                  Ken Shelton MD  03/21/25 1219

## 2025-03-10 NOTE — TELEPHONE ENCOUNTER
"Advised patient per Dr. Martinez :    \"Please notify patient her lab result came back reassuring finding of no ovulation noted based on that recommend Provera every 2 to 3 months if she does not have her menses she need to do pregnancy test prior medication called to the pharmacy \"    PAtient would like to know how often to take pill, advised prescription written as daily.  She would like to know if she gets menses , does she still take the pill. She would like clarification on how to take prescription.   "

## 2025-03-11 ENCOUNTER — OFFICE VISIT (OUTPATIENT)
Dept: PHYSICAL THERAPY | Facility: CLINIC | Age: 40
End: 2025-03-11
Payer: COMMERCIAL

## 2025-03-11 DIAGNOSIS — G43.709 CHRONIC MIGRAINE WITHOUT AURA WITHOUT STATUS MIGRAINOSUS, NOT INTRACTABLE: ICD-10-CM

## 2025-03-11 DIAGNOSIS — G95.89 PERSISTENT CENTRAL CANAL SYRINX (HCC): Primary | ICD-10-CM

## 2025-03-11 DIAGNOSIS — M54.2 CERVICALGIA: ICD-10-CM

## 2025-03-11 DIAGNOSIS — M25.511 ACUTE PAIN OF RIGHT SHOULDER: ICD-10-CM

## 2025-03-11 PROCEDURE — 97110 THERAPEUTIC EXERCISES: CPT

## 2025-03-11 NOTE — PROGRESS NOTES
PT Re-Evaluation     Today's date: 3/11/2025  Patient name: Dawit Mckeon  : 1985  MRN: [id number]  Referring provider: Marycruz Farr PA-C  Dx:   Encounter Diagnosis     ICD-10-CM    1. Persistent central canal syrinx (HCC)  G95.89       2. Cervicalgia  M54.2       3. Acute pain of right shoulder  M25.511       4. Chronic migraine without aura without status migrainosus, not intractable  G43.709             Start Time: 735  Stop Time: 803  Total time in clinic (min): 28 minutes    Assessment  Impairments: abnormal or restricted ROM, activity intolerance, impaired physical strength, pain with function, participation limitations, activity limitations and endurance    Assessment details: Problem List:  1) Cervical spine hypomobility  2) R shoulder hypomobility  3) R shoulder strength impairment    Dawit Mckeon is a pleasant 40 y.o. female who presents with neck pain/stiffness, headache, dizziness, and R shoulder pain. She has improved significantly with cervical spine hypomobility, R shoulder hypomobility and pain with movement, and R shoulder strength impairments. 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 Skilled PT services are still indicated to address impairments. Encouraged pt to follow up with PCP regarding severe migraine that she experienced yesterday.    Comparable signs:  1) Reaching overhead  2) Reaching behind back  3) Neck AROM    Goals  1. Pt will perform prescribed HEP independently. -- met 100%  2. Pt will be able to manage symptoms independently. -- progressing 75%  3. Pt will report 75% or greater decrease in headache frequency and severity -- met 50%  4. Pt will report ability to work a full day with no increase in neck and shoulder pain. -- progressing 25%  5. Pt will demo ability to reach  behind her back with RUE without shoulder pain. -- progressing 25%      Plan  Patient would benefit from: skilled physical therapy and home program  Planned modality interventions: unattended electrical stimulation, thermotherapy: hydrocollator packs, cryotherapy and biofeedback    Planned therapy interventions: abdominal trunk stabilization, joint mobilization, manual therapy, neuromuscular re-education, strengthening, flexibility, stretching, therapeutic activities, functional ROM exercises, therapeutic exercise and home exercise program    Frequency: 1-2x week  Duration in weeks: 12  Plan of Care beginning date: 2025  Plan of Care expiration date: 2025  Treatment plan discussed with: patient      Subjective Evaluation    History of Present Illness  Mechanism of injury: Patient is a 40 y.o. Female who reports to OP PT for re-evaluation and treatment of neck pain/stiffness, dizziness, headache, and R shoulder pain following visit to ED on 3/10. Reports that her migraine pain was so intense that she had to go to the hospital. Reports that she felt pain on the top of her head and had significant twitching of her L eye. Reports that she had some dizziness as well. Reports that she was discharged early in the morning and directed to visit PCP for further evaluation. Reports that overall, dizziness has been decreasing since starting PT.  Reports that her R shoulder pain is improving and range of motion is improving as well.   Pain  Current pain rating: 3  At best pain ratin  At worst pain ratin  Quality: pressure, dull ache, sharp and knife-like          Objective      AROM R L   Cervical spine flexion WNL   Cervical spine extension WNL   Cervical spine rotation WNL Limited 20%   Cervical spine side bend WNL WNL   Cervical spine protraction WNL, pain on CTJ   Cervical spine retraction WNL      MMT R L   Shoulder flexion 4+ 5   Shoulder extension     Shoulder abduction 4 5   Shoulder adduction      Shoulder ER 4 5   Shoulder IR 5 5   Elbow flexion 5 5   Elbow extension 5 5   Supination     Pronation     Wrist flexion 5 5   Wrist extension 5               5    Wrist radial deviation     Wrist ulnar deviation       AROM R L   Shoulder flexion 156* shoulder pain (less);    165 deg after shoulder flexion ballistic 160   Shoulder extension     Shoulder abduction 170* shoulder pain (less) 180   Shoulder adduction     Shoulder ER 80 85   Shoulder IR 20* shoulder pain 85   Elbow flexion     Elbow extension     Supination     Pronation     Wrist flexion     Wrist extension     Wrist radial deviation     Wrist ulnar deviation        Functional IR reach: limited 25% R, concordant shoulder pain, WNL L  Functional ER reach: WNL R, WNL L         Precautions: vertigo    Manuals 2/18 2/25 3/4 3/11                                                             Neuro Re-Ed  Postural ed and progression x 10           Scapula retraction             Prone shoulder Y             Prone shoulder T             Shoulder D2 flexion    Tband (yellow)3x10                                                Ther Ex             Cx retraction Sitting  X10  2x10 w/ pt OP, x15 w/ pt OP Retraction 2 x 10, Sustained 2 x 1 min better.             Cx retraction+extension    X10,  2x10 w/ pt OP         R shoulder extension   Repeated, 2x10 w/ IR bias, x10 w/ ER bias          R shoulder flexion   Repeated x10, x15 W/ Tband x10;    2x10 ballistic         Row   10# x15 (produced R shoulder pain)                       Retraction + Ext  6 x 5                                     Edu Re: objective findings, POC, HEP   Re: objective findings, HEP         Ther Activity                                       Gait Training                                       Modalities

## 2025-03-12 ENCOUNTER — OFFICE VISIT (OUTPATIENT)
Dept: FAMILY MEDICINE CLINIC | Facility: CLINIC | Age: 40
End: 2025-03-12
Payer: COMMERCIAL

## 2025-03-12 VITALS
RESPIRATION RATE: 16 BRPM | OXYGEN SATURATION: 98 % | WEIGHT: 158 LBS | HEIGHT: 64 IN | TEMPERATURE: 98.3 F | SYSTOLIC BLOOD PRESSURE: 120 MMHG | HEART RATE: 82 BPM | BODY MASS INDEX: 26.98 KG/M2 | DIASTOLIC BLOOD PRESSURE: 78 MMHG

## 2025-03-12 DIAGNOSIS — M79.671 PAIN OF RIGHT HEEL: ICD-10-CM

## 2025-03-12 DIAGNOSIS — M79.671 CHRONIC HEEL PAIN, RIGHT: ICD-10-CM

## 2025-03-12 DIAGNOSIS — G89.29 CHRONIC HEEL PAIN, RIGHT: ICD-10-CM

## 2025-03-12 DIAGNOSIS — G24.5 EYE TWITCH: ICD-10-CM

## 2025-03-12 DIAGNOSIS — G89.29 CHRONIC RIGHT SHOULDER PAIN: Primary | ICD-10-CM

## 2025-03-12 DIAGNOSIS — M25.511 CHRONIC RIGHT SHOULDER PAIN: Primary | ICD-10-CM

## 2025-03-12 DIAGNOSIS — B35.3 TINEA PEDIS OF RIGHT FOOT: ICD-10-CM

## 2025-03-12 LAB — 17OHP SERPL-MCNC: 61 NG/DL

## 2025-03-12 PROCEDURE — 99214 OFFICE O/P EST MOD 30 MIN: CPT | Performed by: FAMILY MEDICINE

## 2025-03-12 NOTE — ASSESSMENT & PLAN NOTE
Has had for 6 mo getting PT possible impingement syndrome  needs xray and ortho eval     Orders:  •  XR shoulder 2+ vw right; Future  •  Ambulatory Referral to Orthopedic Surgery; Future

## 2025-03-12 NOTE — PROGRESS NOTES
"Name: Dawit Mckeon      : 1985      MRN: [id number]  Encounter Provider: Sera Shelley MD  Encounter Date: 3/12/2025   Encounter department: [facility]  :  Assessment & Plan  Chronic right shoulder pain  Has had for 6 mo getting PT possible impingement syndrome  needs xray and ortho eval     Orders:  •  XR shoulder 2+ vw right; Future  •  Ambulatory Referral to Orthopedic Surgery; Future    Tinea pedis of right foot  OTC lamisal bid 4 weeks        Pain of right heel    Orders:  •  Ambulatory Referral to Podiatry; Future    Chronic heel pain, right  Pt to see podiatry       Eye twitch  Nl   exam reassurance  given               History of Present Illness   Pt with pain in right shoulder pain getting therapy ordered by neurology had neck pain now better  for this  last week had pain in left leg not joint  no back pain now better   pt also with right heel pain for 1 month no new exercise or shoe; pt with itchy rash in between 4-5 right toe for several weeks  also recently with twitch in eye    Arthritis  Associated symptoms include rash (right interdigit 4-5 right toe).   Shoulder Pain       Review of Systems   Musculoskeletal:  Positive for arthralgias (right shoulder  pain 6 mo) and arthritis.        Right heel pain for  1 month   Skin:  Positive for rash (right interdigit 4-5 right toe).   Neurological:         Left eye twitch for a few days       Objective   /78 (BP Location: Left arm, Patient Position: Sitting, Cuff Size: Standard)   Pulse 82   Temp 98.3 °F (36.8 °C) (Tympanic)   Resp 16   Ht 5' 4\" (1.626 m)   Wt 71.7 kg (158 lb)   LMP 2024 (Exact Date)   SpO2 98%   BMI 27.12 kg/m²      Physical Exam  Musculoskeletal:      Comments: Tender heel and achilles insertion tender subacromial area and biceps tendon    Skin:     Findings: Rash (red desquamation in between right 4-5 toe) present.         "

## 2025-03-14 ENCOUNTER — TELEPHONE (OUTPATIENT)
Age: 40
End: 2025-03-14

## 2025-03-14 NOTE — TELEPHONE ENCOUNTER
Patient called back, we reviewed Dr. Martinez's lab note. Patient viewed [employer] message.  Patient verbalized understanding, no questions.

## 2025-03-18 ENCOUNTER — OFFICE VISIT (OUTPATIENT)
Dept: PHYSICAL THERAPY | Facility: CLINIC | Age: 40
End: 2025-03-18
Payer: COMMERCIAL

## 2025-03-18 DIAGNOSIS — M54.2 CERVICALGIA: ICD-10-CM

## 2025-03-18 DIAGNOSIS — M25.511 ACUTE PAIN OF RIGHT SHOULDER: ICD-10-CM

## 2025-03-18 DIAGNOSIS — G95.89 PERSISTENT CENTRAL CANAL SYRINX (HCC): Primary | ICD-10-CM

## 2025-03-18 PROCEDURE — 97110 THERAPEUTIC EXERCISES: CPT

## 2025-03-18 NOTE — PROGRESS NOTES
Daily Note     Today's date: 3/18/2025  Patient name: Dawit Mckeon  : 1985  MRN: [id number]  Referring provider: Marycruz Farr PA-C  Dx:   Encounter Diagnosis     ICD-10-CM    1. Persistent central canal syrinx (HCC)  G95.89       2. Cervicalgia  M54.2       3. Acute pain of right shoulder  M25.511           Start Time: 0740  Stop Time: 0803  Total time in clinic (min): 23 minutes    Subjective: Pt reports that her neck has been feeling very good for the past week; she reports that any time her neck begins to hurt, she performs retractions and it feels better. Reports that she would like to focus on her shoulder today, as it is her primary cause of pain.      Objective: See treatment diary below      Assessment: Altered direction of repeated shoulder flexion by cueing for greater abduction; this abolished pain with reaching behind her back and across body. Provided this for HEP. All treatments tolerated well, no adverse response.      Plan: Continue per plan of care.      Precautions: vertigo    Manuals 2/18 2/25 3/4 3/11 3/18                                                            Neuro Re-Ed  Postural ed and progression x 10           Scapula retraction             Prone shoulder Y             Prone shoulder T             Shoulder D2 flexion    Tband (yellow)3x10                                                Ther Ex             Cx retraction Sitting  X10  2x10 w/ pt OP, x15 w/ pt OP Retraction 2 x 10, Sustained 2 x 1 min better.             Cx retraction+extension    X10,  2x10 w/ pt OP         R shoulder extension   Repeated, 2x10 w/ IR bias, x10 w/ ER bias          R shoulder flexion   Repeated x10, x15 W/ Tband x10;    2x10 ballistic Finger ladder 2x15, 2x15 IR;    Band IR 2x15;  ER 2x15;    Neutral, greater abduction 2x15        Row   10# x15 (produced R shoulder pain)          Resisted shoulder flexion     3# 3x10        Retraction + Ext  6 x 5                                     Edu Re:  objective findings, POC, HEP   Re: objective findings, HEP         Ther Activity                                       Gait Training                                       Modalities

## 2025-03-19 ENCOUNTER — VBI (OUTPATIENT)
Dept: ADMINISTRATIVE | Facility: OTHER | Age: 40
End: 2025-03-19

## 2025-03-19 NOTE — TELEPHONE ENCOUNTER
03/19/25 2:49 PM     Chart reviewed for Pap Smear (HPV) aka Cervical Cancer Screening was/were submitted to the patient's insurance.     Tameka Titus MA   [facility]

## 2025-03-20 ENCOUNTER — OFFICE VISIT (OUTPATIENT)
Dept: OBGYN CLINIC | Facility: CLINIC | Age: 40
End: 2025-03-20
Payer: COMMERCIAL

## 2025-03-20 ENCOUNTER — HOSPITAL ENCOUNTER (OUTPATIENT)
Dept: RADIOLOGY | Facility: HOSPITAL | Age: 40
Discharge: HOME | End: 2025-03-20
Attending: ORTHOPAEDIC SURGERY
Payer: COMMERCIAL

## 2025-03-20 VITALS — WEIGHT: 157 LBS | BODY MASS INDEX: 26.8 KG/M2 | HEIGHT: 64 IN

## 2025-03-20 DIAGNOSIS — M25.511 RIGHT SHOULDER PAIN, UNSPECIFIED CHRONICITY: ICD-10-CM

## 2025-03-20 DIAGNOSIS — M24.811 INTERNAL DERANGEMENT OF RIGHT SHOULDER: Primary | ICD-10-CM

## 2025-03-20 DIAGNOSIS — M25.511 CHRONIC RIGHT SHOULDER PAIN: ICD-10-CM

## 2025-03-20 DIAGNOSIS — G89.29 CHRONIC RIGHT SHOULDER PAIN: ICD-10-CM

## 2025-03-20 PROCEDURE — 73030 X-RAY EXAM OF SHOULDER: CPT

## 2025-03-20 PROCEDURE — 99244 OFF/OP CNSLTJ NEW/EST MOD 40: CPT | Performed by: ORTHOPAEDIC SURGERY
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
## 2025-03-25 ENCOUNTER — OFFICE VISIT (OUTPATIENT)
Dept: PHYSICAL THERAPY | Facility: CLINIC | Age: 40
End: 2025-03-25
Payer: COMMERCIAL

## 2025-03-25 DIAGNOSIS — G43.709 CHRONIC MIGRAINE WITHOUT AURA WITHOUT STATUS MIGRAINOSUS, NOT INTRACTABLE: ICD-10-CM

## 2025-03-25 DIAGNOSIS — M54.2 CERVICALGIA: ICD-10-CM

## 2025-03-25 DIAGNOSIS — M25.511 ACUTE PAIN OF RIGHT SHOULDER: ICD-10-CM

## 2025-03-25 DIAGNOSIS — G95.89 PERSISTENT CENTRAL CANAL SYRINX (HCC): Primary | ICD-10-CM

## 2025-03-25 PROCEDURE — 97110 THERAPEUTIC EXERCISES: CPT

## 2025-03-25 NOTE — PROGRESS NOTES
Daily Note     Today's date: 3/25/2025  Patient name: Dawit Mckeon  : 1985  MRN: [id number]  Referring provider: Marycruz Farr PA-C  Dx:   Encounter Diagnosis     ICD-10-CM    1. Persistent central canal syrinx (HCC)  G95.89       2. Cervicalgia  M54.2       3. Acute pain of right shoulder  M25.511       4. Chronic migraine without aura without status migrainosus, not intractable  G43.709             Start Time: 0740  Stop Time: 0800  Total time in clinic (min): 20 minutes    Subjective: Pt reports that her neck has been feeling very good and she has not had any pain, dizziness, or headaches in the past week. Reports that her R shoulder has intermittent improvement, but still hurts when she reaches backwards.      Objective: See treatment diary below      Assessment: Continued with shoulder flexion repeated motions; included bias into IR and ballistic, which decreased pain levels most. All treatments tolerated well, no adverse response.      Plan: Continue per plan of care.      Precautions: vertigo    Manuals 2/18 2/25 3/4 3/11 3/18 3/25                                                 Neuro Re-Ed  Postural ed and progression x 10         Scapula retraction           Prone shoulder Y           Prone shoulder T           Shoulder D2 flexion    Tband (yellow)3x10                                        Ther Ex           Cx retraction Sitting  X10  2x10 w/ pt OP, x15 w/ pt OP Retraction 2 x 10, Sustained 2 x 1 min better.           Cx retraction+extension    X10,  2x10 w/ pt OP       R shoulder extension   Repeated, 2x10 w/ IR bias, x10 w/ ER bias        R shoulder flexion   Repeated x10, x15 W/ Tband x10;    2x10 ballistic Finger ladder 2x15, 2x15 IR;    Band IR 2x15;  ER 2x15;    Neutral, greater abduction 2x15 All with IR bias:    Band overhead 2x15, 2x15 ballistic    Supine AROM w/ clinician OP 2x15     Row   10# x15 (produced R shoulder pain)        Resisted shoulder flexion     3# 3x10      Retraction  + Ext  6 x 5                               Edu Re: objective findings, POC, HEP   Re: objective findings, HEP       Ther Activity                                 Gait Training                                 Modalities

## 2025-04-01 ENCOUNTER — OFFICE VISIT (OUTPATIENT)
Dept: PHYSICAL THERAPY | Facility: CLINIC | Age: 40
End: 2025-04-01
Payer: COMMERCIAL

## 2025-04-01 DIAGNOSIS — G43.709 CHRONIC MIGRAINE WITHOUT AURA WITHOUT STATUS MIGRAINOSUS, NOT INTRACTABLE: ICD-10-CM

## 2025-04-01 DIAGNOSIS — G95.89 PERSISTENT CENTRAL CANAL SYRINX (HCC): Primary | ICD-10-CM

## 2025-04-01 DIAGNOSIS — M54.2 CERVICALGIA: ICD-10-CM

## 2025-04-01 DIAGNOSIS — M25.511 ACUTE PAIN OF RIGHT SHOULDER: ICD-10-CM

## 2025-04-01 PROCEDURE — 97110 THERAPEUTIC EXERCISES: CPT

## 2025-04-01 RX ORDER — MULTIVITAMIN WITH IRON
100 TABLET ORAL DAILY
COMMUNITY

## 2025-04-01 NOTE — PRE-PROCEDURE INSTRUCTIONS
Pre-Surgery Instructions:   Medication Instructions    Cholecalciferol (VITAMIN D3) 1,000 units tablet Stop taking 7 days prior to surgery.    medroxyPROGESTERone (PROVERA) 10 mg tablet Take day of surgery.    propranolol (INDERAL LA) 80 mg 24 hr capsule Take day of surgery.    SUMAtriptan (Imitrex) 100 mg tablet Uses PRN- OK to take day of surgery    vitamin B-12 (CYANOCOBALAMIN) 100 MCG tablet Stop taking 7 days prior to surgery.   Medication instructions for day of surgery reviewed. Please take all instructed medications with only a sip of water.       You will receive a call one business day prior to surgery with an arrival time and hospital directions. If your surgery is scheduled on a Monday, the hospital will be calling you on the Friday prior to your surgery. If you have not heard from anyone by 8pm, please call the hospital supervisor through the hospital  at [phone]. or [place] [phone]).    Do not eat or drink anything after midnight the night before your surgery, including candy, mints, lifesavers, or chewing gum. Do not drink alcohol 24hrs before your surgery. Try not to smoke at least 24hrs before your surgery.       Follow the pre surgery showering instructions as listed in the “My Surgical Experience Booklet” or otherwise provided by your surgeon's office. Do not use a blade to shave the surgical area 1 week before surgery. It is okay to use a clean electric clippers up to 24 hours before surgery. Do not apply any lotions, creams, including makeup, cologne, deodorant, or perfumes after showering on the day of your surgery. Do not use dry shampoo, hair spray, hair gel, or any type of hair products.     No contact lenses, eye make-up, or artificial eyelashes. Remove nail polish, including gel polish, and any artificial, gel, or acrylic nails if possible. Remove all jewelry including rings and body piercing jewelry.     Wear causal clothing that is easy to take on and off. Consider  your type of surgery.    Keep any valuables, jewelry, piercings at home. Please bring any specially ordered equipment (sling, braces) if indicated.    Arrange for a responsible person to drive you to and from the hospital on the day of your surgery. Please confirm the visitor policy for the day of your procedure when you receive your phone call with an arrival time.     Call the surgeon's office with any new illnesses, exposures, or additional questions prior to surgery.    Please reference your “My Surgical Experience Booklet” for additional information to prepare for your upcoming surgery.

## 2025-04-01 NOTE — PROGRESS NOTES
Daily Note     Today's date: 2025  Patient name: Dawit Mckeon  : 1985  MRN: [id number]  Referring provider: Marycruz Farr PA-C  Dx:   Encounter Diagnosis     ICD-10-CM    1. Persistent central canal syrinx (HCC)  G95.89       2. Cervicalgia  M54.2       3. Acute pain of right shoulder  M25.511       4. Chronic migraine without aura without status migrainosus, not intractable  G43.709           Start Time: 1438  Stop Time: 1501  Total time in clinic (min): 23 minutes    Subjective: Pt reports that she is having a bad migraine today; started at 10/10 pain, but decreased to 9/10 with cervical retraction+extension in waiting room. Reports mild dizziness as well.      Objective: See treatment diary below      Assessment: Pt reported decrease in migraine pain to to 6/10 immediately following treatment, however she reported increase back to more intense pain upon leaving the treatment area. As headache and migraines had been lower in frequency and intensity, it is likely that stress of pt's brother passing away is affecting tension in neck, leading to increase in pain. Cervical flexion-rotation test negative; resulted in reduction of headache symptoms. R shoulder pain decreased slightly with repeated flexion/abduction to end range. All treatments tolerated well, no adverse response.      Plan: Continue per plan of care.      Precautions: vertigo    Manuals  3/4 3/11 3/18 3/25 4/1    STM       Cx suboccipitals, paraspinals                                     Neuro Re-Ed  Postural ed and progression x 10         Scapula retraction           Prone shoulder Y           Prone shoulder T           Shoulder D2 flexion    Tband (yellow)3x10                                        Ther Ex           Cx retraction Sitting  X10  2x10 w/ pt OP, x15 w/ pt OP Retraction 2 x 10, Sustained 2 x 1 min better.       2x10    supine  x10    Cx retraction+extension    X10,  2x10 w/ pt OP   2x10    R shoulder extension    Repeated, 2x10 w/ IR bias, x10 w/ ER bias        R shoulder flexion   Repeated x10, x15 W/ Tband x10;    2x10 ballistic Finger ladder 2x15, 2x15 IR;    Band IR 2x15;  ER 2x15;    Neutral, greater abduction 2x15 All with IR bias:    Band overhead 2x15, 2x15 ballistic    Supine AROM w/ clinician OP 2x15 Seated x10 w/ clinician OP    X15 w/ pt OP    Row   10# x15 (produced R shoulder pain)        Resisted shoulder flexion     3# 3x10      Retraction + Ext  6 x 5         Cx flexion+rotation       X10 B               Edu Re: objective findings, POC, HEP   Re: objective findings, HEP       Ther Activity                                 Gait Training                                 Modalities

## 2025-04-03 ENCOUNTER — HOSPITAL ENCOUNTER (OUTPATIENT)
Dept: MRI IMAGING | Facility: HOSPITAL | Age: 40
End: 2025-04-03
Attending: ORTHOPAEDIC SURGERY
Payer: COMMERCIAL

## 2025-04-03 DIAGNOSIS — M24.811 INTERNAL DERANGEMENT OF RIGHT SHOULDER: ICD-10-CM

## 2025-04-03 DIAGNOSIS — M25.511 RIGHT SHOULDER PAIN, UNSPECIFIED CHRONICITY: ICD-10-CM

## 2025-04-03 PROCEDURE — 73221 MRI JOINT UPR EXTREM W/O DYE: CPT

## 2025-04-07 ENCOUNTER — ANESTHESIA EVENT (OUTPATIENT)
Dept: PERIOP | Facility: HOSPITAL | Age: 40
End: 2025-04-07
Payer: COMMERCIAL

## 2025-04-07 NOTE — ANESTHESIA PREPROCEDURE EVALUATION
Procedure:  EXCISION  BIOPSY LESION/MASS BACK x2 (Back)  EXCISION BIOPSY TISSUE LESION/MASS LOWER EXTREMITY (Left: Leg Upper)      Propanolol - last use 2 days ago  Migraine earlier today improved    Denies the following: CP/SOB with exertion, asthma, COPD, PINA, stroke/TIA, seizure    Relevant Problems   CARDIO   (+) Chronic migraine without aura      GI/HEPATIC   (+) Gastroesophageal reflux disease without esophagitis      MUSCULOSKELETAL   (+) Eye twitch      NEURO/PSYCH   (+) Chronic heel pain, right   (+) Chronic migraine without aura   (+) Chronic right shoulder pain   (+) Persistent central canal syrinx (HCC)        Physical Exam    Airway    Mallampati score: I  TM Distance: >3 FB  Neck ROM: full     Dental   No notable dental hx     Cardiovascular      Pulmonary      Other Findings  post-pubertal.      Anesthesia Plan  ASA Score- 1     Anesthesia Type- general with ASA Monitors.         Additional Monitors:     Airway Plan: LMA.    Comment: MAC vs GA LMA, consented for both.       Plan Factors-Exercise tolerance (METS): >4 METS.    Chart reviewed.   Existing labs reviewed. Patient summary reviewed.    Patient is not a current smoker.      Obstructive sleep apnea risk education given perioperatively.        Induction-     Postoperative Plan-         Informed Consent- Anesthetic plan and risks discussed with patient.  I personally reviewed this patient with the CRNA. Discussed and agreed on the Anesthesia Plan with the CRNA..      NPO Status:  No vitals data found for the desired time range.

## 2025-04-08 ENCOUNTER — APPOINTMENT (OUTPATIENT)
Dept: PHYSICAL THERAPY | Facility: CLINIC | Age: 40
End: 2025-04-08
Payer: COMMERCIAL

## 2025-04-09 ENCOUNTER — OFFICE VISIT (OUTPATIENT)
Dept: OBGYN CLINIC | Facility: CLINIC | Age: 40
End: 2025-04-09
Payer: COMMERCIAL

## 2025-04-09 VITALS — BODY MASS INDEX: 26.41 KG/M2 | WEIGHT: 154.7 LBS | HEIGHT: 64 IN

## 2025-04-09 DIAGNOSIS — M75.51 BURSITIS OF RIGHT SHOULDER: Primary | ICD-10-CM

## 2025-04-09 DIAGNOSIS — M75.51 ACUTE SHOULDER BURSITIS, RIGHT: ICD-10-CM

## 2025-04-09 PROCEDURE — 20610 DRAIN/INJ JOINT/BURSA W/O US: CPT | Performed by: ORTHOPAEDIC SURGERY

## 2025-04-09 PROCEDURE — 99214 OFFICE O/P EST MOD 30 MIN: CPT | Performed by: ORTHOPAEDIC SURGERY

## 2025-04-09 RX ORDER — BUPIVACAINE HYDROCHLORIDE 2.5 MG/ML
4 INJECTION, SOLUTION INFILTRATION; PERINEURAL
Status: COMPLETED | OUTPATIENT
Start: 2025-04-09 | End: 2025-04-09

## 2025-04-09 RX ORDER — TRIAMCINOLONE ACETONIDE 40 MG/ML
40 INJECTION, SUSPENSION INTRA-ARTICULAR; INTRAMUSCULAR
Status: COMPLETED | OUTPATIENT
Start: 2025-04-09 | End: 2025-04-09

## 2025-04-09 RX ADMIN — BUPIVACAINE HYDROCHLORIDE 4 ML: 2.5 INJECTION, SOLUTION INFILTRATION; PERINEURAL at 10:15

## 2025-04-09 RX ADMIN — TRIAMCINOLONE ACETONIDE 40 MG: 40 INJECTION, SUSPENSION INTRA-ARTICULAR; INTRAMUSCULAR at 10:15

## 2025-04-09 NOTE — PROGRESS NOTES
[facility] [address] [address] [phone]       Dawit Mckeon  [id number]  1985    ORTHOPAEDIC SURGERY OUTPATIENT NOTE  4/9/2025    :  Assessment & Plan  Bursitis of right shoulder    Orders:    Large joint arthrocentesis: R subacromial bursa    Ambulatory referral to Physical Therapy; Future  MRI right shoulder demonstrates moderate subacromial subdeltoid bursitis , no evidence of rotator cuff tear   Discussed with patient conservative treatments which includes steroid injection and therapy   Patient received a right subacromial bursa steroid injection in the office today   Therapy order was placed for conditioning and strengthening exercises   She is to follow up if her symptoms do not improve or worsen   Acute shoulder bursitis, right           HISTORY:  40 y.o. female presents today follow-up for her right shoulder.  Patient is here today to discuss MRI of the right shoulder.  States her right shoulder is doing the same since last office visit.  He continues to have trouble sleeping at night due to the pain.  She notes her pain is worse with lifting and with internal rotation.  Patient has been taking Advil for pain relief.    Past Medical History:   Diagnosis Date    Migraines        Past Surgical History:   Procedure Laterality Date    NO PAST SURGERIES         Social History     Socioeconomic History    Marital status: /Civil Union     Spouse name: Not on file    Number of children: Not on file    Years of education: Not on file    Highest education level: Not on file   Occupational History    Not on file   Tobacco Use    Smoking status: Never    Smokeless tobacco: Never   Vaping Use    Vaping status: Never Used   Substance and Sexual Activity    Alcohol use: Never    Drug use: Never    Sexual activity: Not Currently   Other Topics Concern    Not on file   Social History Narrative    Who lives in your  "home: kids and sister in law    What type of home do you live in: Single house    Age of your home:     How long have you been living there: 6 months    Type of heat: Baseboard    Type of fuel: Oil    What type of jarad is in your bedroom: Hardwood floor    Do you have the following in or near your home:    Air products: Window air conditioning    Pests: None    Pets: None    Are pets allowed in bedroom: N/A    Open fields, wooded areas nearby: Open fields and Wooded areas    Basement: None    Exposure to second hand smoke: No        Habits:            Other:              Social Drivers of Health     Financial Resource Strain: Not on file   Food Insecurity: Not on file   Transportation Needs: Not on file   Physical Activity: Not on file   Stress: Not on file   Social Connections: Not on file   Intimate Partner Violence: Not on file   Housing Stability: Not on file       Family History   Problem Relation Age of Onset    Diabetes Mother     Diabetes Father         Patient's Medications   New Prescriptions    No medications on file   Previous Medications    CHOLECALCIFEROL (VITAMIN D3) 1,000 UNITS TABLET    Take 1,000 Units by mouth daily    MEDROXYPROGESTERONE (PROVERA) 10 MG TABLET    Take 1 tablet (10 mg total) by mouth daily    PROPRANOLOL (INDERAL LA) 80 MG 24 HR CAPSULE    Take 1 capsule (80 mg total) by mouth daily at bedtime    SUMATRIPTAN (IMITREX) 100 MG TABLET    Use 100mg at onset of migraine and  may repeat x 1 only in 2 hours. No more than 2 tablets in 24 hours    VITAMIN B-12 (CYANOCOBALAMIN) 100 MCG TABLET    Take 100 mcg by mouth daily   Modified Medications    No medications on file   Discontinued Medications    No medications on file       Allergies   Allergen Reactions    Pollen Extract Dermatitis    Seasonal Ic [Cholestatin] Itching     Eyes, nose ,and throat.        Ht 5' 4\" (1.626 m)   Wt 70.2 kg (154 lb 11.2 oz)   LMP 03/17/2025   BMI 26.55 kg/m²      REVIEW OF SYSTEMS:  Constitutional: " "Negative.    HEENT: Negative.    Respiratory: Negative.    Skin: Negative.    Neurological: Negative.    Psychiatric/Behavioral: Negative.  Musculoskeletal: Negative except for that mentioned in the HPI.    Gen: No acute distress, resting comfortably in bed  HEENT: Eyes clear, moist mucus membranes, hearing intact  Respiratory: No audible wheezing or stridor  Cardiovascular: Well Perfused peripherally, 2+ distal pulse  Abdomen: nondistended, no peritoneal signs     PHYSICAL EXAM:    RIGHT SHOULDER:     Appearance: Skin intact      Forward flexion:   180 degrees   Abduction:  180 degrees   External rotation at 90 degrees abduction:   90 degrees   Internal rotation at 90 degrees abduction:  90 degrees with pain   External rotation at 0 degrees:   70 degrees   Internal rotation: T7     STRENGTH:  Forward flexion:  5/5   Abduction:  5/5   External rotation:  5/5   Internal rotation:  5/5        Speed test: +   Yergason's: Negative   Tender to palpation ACJ (acromioclavicular joint): Negative   Tender to palpation LHB (long head of biceps): Negative  Tender to palpation greater tuberosity :+   Poe test: Negative  [place] test: +   Hornblower's: Negative  Lift off: Negative  Belly press: Negative  Bear hug: Negative  External lag sign: Negative  Cross-body adduction: Negative  Sulcus sign: Negative  Selma's test: +   Drop arm test: negative     Radial/median/ulnar nerve intact     <2 sec cap refill    IMAGING:    MRI right shoulder demonstrates moderate subacromial subdeltoid bursitis , no evidence of rotator cuff tear   ]    Large joint arthrocentesis: R subacromial bursa  [place] Protocol:  procedure performed by consultantConsent: Verbal consent obtained.  Risks and benefits: risks, benefits and alternatives were discussed  Consent given by: patient  Time out: Immediately prior to procedure a \"time out\" was called to verify the correct patient, procedure, equipment, support staff and site/side marked as " required.  Timeout called at: 4/9/2025 10:35 AM.  Site marked: the operative site was marked  Supporting Documentation  Indications: pain   Procedure Details  Location: shoulder - R subacromial bursa  Needle size: 22 G  Approach: posterior  Medications administered: 4 mL bupivacaine 0.25 %; 40 mg triamcinolone acetonide 40 mg/mL    Patient tolerance: patient tolerated the procedure well with no immediate complications  Dressing:  Sterile dressing applied         Scribe Attestation      I,:  Neyda Breaux MA am acting as a scribe while in the presence of the attending physician.:       I,:  Elsa García DO personally performed the services described in this documentation    as scribed in my presence.:

## 2025-04-10 ENCOUNTER — ANESTHESIA (OUTPATIENT)
Dept: PERIOP | Facility: HOSPITAL | Age: 40
End: 2025-04-10
Payer: COMMERCIAL

## 2025-04-10 ENCOUNTER — HOSPITAL ENCOUNTER (OUTPATIENT)
Facility: HOSPITAL | Age: 40
Setting detail: OUTPATIENT SURGERY
Discharge: HOME | End: 2025-04-10
Attending: SURGERY | Admitting: SURGERY
Payer: COMMERCIAL

## 2025-04-10 VITALS
BODY MASS INDEX: 26.8 KG/M2 | SYSTOLIC BLOOD PRESSURE: 120 MMHG | TEMPERATURE: 97.4 F | HEIGHT: 64 IN | HEART RATE: 95 BPM | WEIGHT: 157 LBS | DIASTOLIC BLOOD PRESSURE: 77 MMHG | OXYGEN SATURATION: 100 % | RESPIRATION RATE: 16 BRPM

## 2025-04-10 DIAGNOSIS — L72.3 SEBACEOUS CYST: ICD-10-CM

## 2025-04-10 LAB
EXT PREGNANCY TEST URINE: NEGATIVE
EXT. CONTROL: NORMAL

## 2025-04-10 PROCEDURE — 11402 EXC TR-EXT B9+MARG 1.1-2 CM: CPT | Performed by: PHYSICIAN ASSISTANT

## 2025-04-10 PROCEDURE — 12032 INTMD RPR S/A/T/EXT 2.6-7.5: CPT | Performed by: SURGERY

## 2025-04-10 PROCEDURE — 12032 INTMD RPR S/A/T/EXT 2.6-7.5: CPT | Performed by: PHYSICIAN ASSISTANT

## 2025-04-10 PROCEDURE — 81025 URINE PREGNANCY TEST: CPT | Performed by: SURGERY

## 2025-04-10 PROCEDURE — 88304 TISSUE EXAM BY PATHOLOGIST: CPT | Performed by: PATHOLOGY

## 2025-04-10 PROCEDURE — NC001 PR NO CHARGE: Performed by: SURGERY

## 2025-04-10 PROCEDURE — 11404 EXC TR-EXT B9+MARG 3.1-4 CM: CPT | Performed by: PHYSICIAN ASSISTANT

## 2025-04-10 PROCEDURE — 11404 EXC TR-EXT B9+MARG 3.1-4 CM: CPT | Performed by: SURGERY

## 2025-04-10 PROCEDURE — 11403 EXC TR-EXT B9+MARG 2.1-3CM: CPT | Performed by: PHYSICIAN ASSISTANT

## 2025-04-10 PROCEDURE — 11403 EXC TR-EXT B9+MARG 2.1-3CM: CPT | Performed by: SURGERY

## 2025-04-10 PROCEDURE — 11402 EXC TR-EXT B9+MARG 1.1-2 CM: CPT | Performed by: SURGERY

## 2025-04-10 RX ORDER — CEFAZOLIN SODIUM 2 G/50ML
2000 SOLUTION INTRAVENOUS ONCE
Status: COMPLETED | OUTPATIENT
Start: 2025-04-10 | End: 2025-04-10

## 2025-04-10 RX ORDER — DEXAMETHASONE SODIUM PHOSPHATE 10 MG/ML
INJECTION, SOLUTION INTRAMUSCULAR; INTRAVENOUS AS NEEDED
Status: DISCONTINUED | OUTPATIENT
Start: 2025-04-10 | End: 2025-04-10

## 2025-04-10 RX ORDER — KETOROLAC TROMETHAMINE 30 MG/ML
INJECTION, SOLUTION INTRAMUSCULAR; INTRAVENOUS AS NEEDED
Status: DISCONTINUED | OUTPATIENT
Start: 2025-04-10 | End: 2025-04-10

## 2025-04-10 RX ORDER — PROPOFOL 10 MG/ML
INJECTION, EMULSION INTRAVENOUS AS NEEDED
Status: DISCONTINUED | OUTPATIENT
Start: 2025-04-10 | End: 2025-04-10

## 2025-04-10 RX ORDER — FENTANYL CITRATE 50 UG/ML
INJECTION, SOLUTION INTRAMUSCULAR; INTRAVENOUS AS NEEDED
Status: DISCONTINUED | OUTPATIENT
Start: 2025-04-10 | End: 2025-04-10

## 2025-04-10 RX ORDER — ONDANSETRON 2 MG/ML
4 INJECTION INTRAMUSCULAR; INTRAVENOUS ONCE AS NEEDED
Status: DISCONTINUED | OUTPATIENT
Start: 2025-04-10 | End: 2025-04-10 | Stop reason: HOSPADM

## 2025-04-10 RX ORDER — LIDOCAINE HYDROCHLORIDE 20 MG/ML
INJECTION, SOLUTION EPIDURAL; INFILTRATION; INTRACAUDAL; PERINEURAL AS NEEDED
Status: DISCONTINUED | OUTPATIENT
Start: 2025-04-10 | End: 2025-04-10

## 2025-04-10 RX ORDER — BUPIVACAINE HYDROCHLORIDE AND EPINEPHRINE 2.5; 5 MG/ML; UG/ML
INJECTION, SOLUTION EPIDURAL; INFILTRATION; INTRACAUDAL; PERINEURAL AS NEEDED
Status: DISCONTINUED | OUTPATIENT
Start: 2025-04-10 | End: 2025-04-10 | Stop reason: HOSPADM

## 2025-04-10 RX ORDER — EPHEDRINE SULFATE 50 MG/ML
INJECTION INTRAVENOUS AS NEEDED
Status: DISCONTINUED | OUTPATIENT
Start: 2025-04-10 | End: 2025-04-10

## 2025-04-10 RX ORDER — GLYCOPYRROLATE 0.2 MG/ML
INJECTION INTRAMUSCULAR; INTRAVENOUS AS NEEDED
Status: DISCONTINUED | OUTPATIENT
Start: 2025-04-10 | End: 2025-04-10

## 2025-04-10 RX ORDER — FENTANYL CITRATE/PF 50 MCG/ML
25 SYRINGE (ML) INJECTION
Status: DISCONTINUED | OUTPATIENT
Start: 2025-04-10 | End: 2025-04-10 | Stop reason: HOSPADM

## 2025-04-10 RX ORDER — MIDAZOLAM HYDROCHLORIDE 2 MG/2ML
INJECTION, SOLUTION INTRAMUSCULAR; INTRAVENOUS AS NEEDED
Status: DISCONTINUED | OUTPATIENT
Start: 2025-04-10 | End: 2025-04-10

## 2025-04-10 RX ORDER — PHENYLEPHRINE HCL IN 0.9% NACL 1 MG/10 ML
SYRINGE (ML) INTRAVENOUS AS NEEDED
Status: DISCONTINUED | OUTPATIENT
Start: 2025-04-10 | End: 2025-04-10

## 2025-04-10 RX ORDER — ONDANSETRON 2 MG/ML
INJECTION INTRAMUSCULAR; INTRAVENOUS AS NEEDED
Status: DISCONTINUED | OUTPATIENT
Start: 2025-04-10 | End: 2025-04-10

## 2025-04-10 RX ORDER — SODIUM CHLORIDE, SODIUM LACTATE, POTASSIUM CHLORIDE, CALCIUM CHLORIDE 600; 310; 30; 20 MG/100ML; MG/100ML; MG/100ML; MG/100ML
INJECTION, SOLUTION INTRAVENOUS CONTINUOUS PRN
Status: DISCONTINUED | OUTPATIENT
Start: 2025-04-10 | End: 2025-04-10

## 2025-04-10 RX ORDER — HYDROMORPHONE HCL/PF 1 MG/ML
0.5 SYRINGE (ML) INJECTION
Status: DISCONTINUED | OUTPATIENT
Start: 2025-04-10 | End: 2025-04-10 | Stop reason: HOSPADM

## 2025-04-10 RX ORDER — ALBUTEROL SULFATE 0.83 MG/ML
2.5 SOLUTION RESPIRATORY (INHALATION) ONCE AS NEEDED
Status: DISCONTINUED | OUTPATIENT
Start: 2025-04-10 | End: 2025-04-10 | Stop reason: HOSPADM

## 2025-04-10 RX ORDER — ACETAMINOPHEN 10 MG/ML
1000 INJECTION, SOLUTION INTRAVENOUS ONCE
Status: COMPLETED | OUTPATIENT
Start: 2025-04-10 | End: 2025-04-10

## 2025-04-10 RX ADMIN — KETOROLAC TROMETHAMINE 30 MG: 30 INJECTION, SOLUTION INTRAMUSCULAR at 16:14

## 2025-04-10 RX ADMIN — MIDAZOLAM 2 MG: 1 INJECTION INTRAMUSCULAR; INTRAVENOUS at 15:35

## 2025-04-10 RX ADMIN — PROPOFOL 200 MG: 10 INJECTION, EMULSION INTRAVENOUS at 15:45

## 2025-04-10 RX ADMIN — Medication 100 MCG: at 16:07

## 2025-04-10 RX ADMIN — Medication 100 MCG: at 16:05

## 2025-04-10 RX ADMIN — GLYCOPYRROLATE 0.1 MCG: 0.2 INJECTION, SOLUTION INTRAMUSCULAR; INTRAVENOUS at 15:36

## 2025-04-10 RX ADMIN — Medication 200 MCG: at 16:09

## 2025-04-10 RX ADMIN — ONDANSETRON 4 MG: 2 INJECTION INTRAMUSCULAR; INTRAVENOUS at 15:36

## 2025-04-10 RX ADMIN — CEFAZOLIN SODIUM 2000 MG: 2 SOLUTION INTRAVENOUS at 15:51

## 2025-04-10 RX ADMIN — DEXAMETHASONE SODIUM PHOSPHATE 10 MG: 10 INJECTION, SOLUTION INTRAMUSCULAR; INTRAVENOUS at 15:47

## 2025-04-10 RX ADMIN — FENTANYL CITRATE 50 MCG: 50 INJECTION INTRAMUSCULAR; INTRAVENOUS at 15:45

## 2025-04-10 RX ADMIN — Medication 100 MCG: at 16:11

## 2025-04-10 RX ADMIN — LIDOCAINE HYDROCHLORIDE 100 MG: 20 INJECTION, SOLUTION EPIDURAL; INFILTRATION; INTRACAUDAL; PERINEURAL at 15:45

## 2025-04-10 RX ADMIN — EPHEDRINE SULFATE 5 MG: 50 INJECTION, SOLUTION INTRAVENOUS at 16:11

## 2025-04-10 RX ADMIN — ACETAMINOPHEN 1000 MG: 10 INJECTION, SOLUTION INTRAVENOUS at 17:21

## 2025-04-10 RX ADMIN — SODIUM CHLORIDE, SODIUM LACTATE, POTASSIUM CHLORIDE, AND CALCIUM CHLORIDE: .6; .31; .03; .02 INJECTION, SOLUTION INTRAVENOUS at 15:20

## 2025-04-10 RX ADMIN — FENTANYL CITRATE 50 MCG: 50 INJECTION INTRAMUSCULAR; INTRAVENOUS at 16:02

## 2025-04-10 NOTE — ANESTHESIA POSTPROCEDURE EVALUATION
Post-Op Assessment Note    CV Status:  Stable    Pain management: adequate    Multimodal analgesia used between 6 hours prior to anesthesia start to PACU discharge    Mental Status:  Alert and awake   Hydration Status:  Euvolemic   PONV Controlled:  Controlled   Airway Patency:  Patent     Post Op Vitals Reviewed: Yes    No anethesia notable event occurred.    Staff: Anesthesiologist, with CRNAs           Last Filed PACU Vitals:  Vitals Value Taken Time   Temp 97.0 04/10/25 1700      Pulse 110 04/10/25 1659   /71 04/10/25 1700   Resp 19 04/10/25 1659   SpO2 99 % 04/10/25 1659   Vitals shown include unfiled device data.

## 2025-04-10 NOTE — H&P
"H&P - Surgery-General   Name: Dawit Mckeon 40 y.o. female I MRN: [id number]  Unit/Bed#: OR [place] I Date of Admission: 4/10/2025   Date of Service: 4/10/2025 I Hospital Day: 0     Assessment & Plan    Patient is here for excision of multiple cyst under general anesthesia.  History of Present Illness   Dawit Mckeon is a 40 y.o. female who presents with multiple cyst over her back and groin.  No new medical problems since she saw me in the office..    Review of Systems   All other systems reviewed and are negative.    Medical History Review: I have reviewed the patient's PMH, PSH, Social History, Family History, Meds, and Allergies     Objective :  Temp:  [98.3 °F (36.8 °C)] 98.3 °F (36.8 °C)  HR:  [78] 78  BP: (145)/(84) 145/84  Resp:  [14] 14  SpO2:  [98 %] 98 %  O2 Device: None (Room air)      Physical Exam  Vitals reviewed.   Constitutional:       Appearance: Normal appearance.   HENT:      Head: Normocephalic and atraumatic.   Cardiovascular:      Rate and Rhythm: Normal rate and regular rhythm.   Pulmonary:      Effort: Pulmonary effort is normal.      Breath sounds: Normal breath sounds.   Abdominal:      Palpations: Abdomen is soft.   Musculoskeletal:         General: Normal range of motion.      Cervical back: Normal range of motion.   Skin:     General: Skin is warm.             Comments: Cyst present.  3 cm upper back.  2 cm lower back.  2 cm left groin.   Neurological:      General: No focal deficit present.      Mental Status: She is alert.         Lab Results: I have reviewed the following results:  No results for input(s): \"WBC\", \"HGB\", \"HCT\", \"PLT\", \"BANDSPCT\", \"SODIUM\", \"K\", \"CL\", \"CO2\", \"BUN\", \"CREATININE\", \"GLUC\", \"CAIONIZED\", \"MG\", \"PHOS\", \"AST\", \"ALT\", \"ALB\", \"TBILI\", \"DBILI\", \"ALKPHOS\", \"PTT\", \"INR\", \"HSTNI0\", \"HSTNI2\", \"BNP\", \"LACTICACID\" in the last 72 hours.    Imaging Results Review: No pertinent imaging studies reviewed.  Other Study Results Review: No additional pertinent studies " reviewed.    VTE Pharmacologic Prophylaxis: Sequential compression device (Venodyne)   VTE Mechanical Prophylaxis: sequential compression device

## 2025-04-10 NOTE — OP NOTE
OPERATIVE REPORT  PATIENT NAME: Dawit Mckeon    :  1985  MRN: [id number]  Pt Location: [facility] OR ROOM 02    SURGERY DATE: 4/10/2025    Surgeons and Role:     * Brodie Valdez MD - Primary     * Yuridia De PA-C - Assisting    Preop Diagnosis:  Sebaceous cyst [L72.3]    Post-Op Diagnosis Codes:     * Sebaceous cyst [L72.3]    Procedure(s):  EXCISION  BIOPSY LESION/MASS BACK x2  Left - EXCISION BIOPSY TISSUE LESION/MASS THIGH/ LOWER EXTREMITY    Specimen(s):  ID Type Source Tests Collected by Time Destination   1 : cyst from back x2 Tissue Cyst TISSUE EXAM Brodie Valdez MD 4/10/2025 1611    2 : cyst from left inner thigh Tissue Cyst TISSUE EXAM Brodie Valdez MD 4/10/2025 1635        Estimated Blood Loss:   Minimal    Drains:  * No LDAs found *    Anesthesia Type:   General    Operative Indications:  Sebaceous cyst [L72.3]      Operative Findings:  3 cm sebaceous cyst upper back.  2 cm sebaceous cyst left buttock.  Ruptured cyst with scarring in the left groin.  Area 4 cm x 2 cm.    Complications:   None    Procedure and Technique:  Patient brought to the operating room general anesthesia given using LMA.  Patient was placed in right lateral position.  First we tackle the cyst on the back.  The area was prepped and draped in standard fashion.  Timeout was performed.  Patient received preoperative IV antibiotic.  The upper cyst was excised first.  He gave local anesthesia.  Incision was made and deepened through the skin and subcutaneous tissue.  The cyst was dissected from the surrounding area and excised.  Hemostasis was adequate.  Closure was done using 4-0 Monocryl in subcuticular fashion.  We then tackled the left buttocks cyst.  In a similar fashion local anesthesia was given at the site of the cyst.  Transverse incision was made.  The cyst was dissected from the surrounding tissue and excised.  Closure was done using 4-0 Monocryl subcuticular.  Exofin was applied at both the incisions.  At this  time we move the patient in supine position.  The left groin area was prepped and draped in standard fashion.  Timeout was reconfirmed.  Local anesthesia was given.  The area of the cyst along with the overlying skin which was scarred was excised using #15 scalpel.  There was some bleeding from the subcutaneous tissue which was controlled using electrocautery.  The closure was done in 2 layers using 3-0 Vicryl interrupted for the deeper layer and 4-0 Monocryl subcuticular for the skin.  Exofin was applied.  Patient reversed from anesthesia and taken to recovery under stable condition.   I was present for the entire procedure., A qualified resident physician was not available., and A physician assistant was required during the procedure for retraction, tissue handling, dissection and suturing.    Patient Disposition:  PACU              SIGNATURE: Brodie Valdez MD  DATE: April 10, 2025  TIME: 5:09 PM

## 2025-04-10 NOTE — ANESTHESIA POSTPROCEDURE EVALUATION
Post-Op Assessment Note    CV Status:  Stable    Pain management: adequate    Multimodal analgesia used between 6 hours prior to anesthesia start to PACU discharge    Mental Status:  Alert and awake   Hydration Status:  Euvolemic   PONV Controlled:  Controlled   Airway Patency:  Patent     Post Op Vitals Reviewed: Yes    No anethesia notable event occurred.    Staff: Anesthesiologist, with CRNAs           Last Filed PACU Vitals:  Vitals Value Taken Time   Temp 98.3 °F (36.8 °C) 04/10/25 1713   Pulse 79 04/10/25 1726   /65 04/10/25 1720   Resp 27 04/10/25 1726   SpO2 99 % 04/10/25 1726   Vitals shown include unfiled device data.    Modified Giovanni:     Vitals Value Taken Time   Activity 2 04/10/25 1713   Respiration 2 04/10/25 1713   Circulation 2 04/10/25 1713   Consciousness 1 04/10/25 1713   Oxygen Saturation 2 04/10/25 1713     Modified Giovanni Score: 9

## 2025-04-14 PROCEDURE — 88304 TISSUE EXAM BY PATHOLOGIST: CPT | Performed by: PATHOLOGY

## 2025-04-15 ENCOUNTER — EVALUATION (OUTPATIENT)
Dept: PHYSICAL THERAPY | Facility: CLINIC | Age: 40
End: 2025-04-15
Attending: PHYSICIAN ASSISTANT
Payer: COMMERCIAL

## 2025-04-15 DIAGNOSIS — M25.511 ACUTE PAIN OF RIGHT SHOULDER: ICD-10-CM

## 2025-04-15 DIAGNOSIS — G95.89 PERSISTENT CENTRAL CANAL SYRINX (HCC): Primary | ICD-10-CM

## 2025-04-15 DIAGNOSIS — G43.709 CHRONIC MIGRAINE WITHOUT AURA WITHOUT STATUS MIGRAINOSUS, NOT INTRACTABLE: ICD-10-CM

## 2025-04-15 DIAGNOSIS — M54.2 CERVICALGIA: ICD-10-CM

## 2025-04-15 PROCEDURE — 97110 THERAPEUTIC EXERCISES: CPT

## 2025-04-15 NOTE — PROGRESS NOTES
PT Discharge    Today's date: 4/15/2025  Patient name: Dawit Mckeon  : 1985  MRN: [id number]  Referring provider: Marycruz Farr PA-C  Dx:   Encounter Diagnosis     ICD-10-CM    1. Persistent central canal syrinx (HCC)  G95.89       2. Cervicalgia  M54.2       3. Acute pain of right shoulder  M25.511       4. Chronic migraine without aura without status migrainosus, not intractable  G43.709           Start Time: 739  Stop Time: 805  Total time in clinic (min): 26 minutes    Assessment  Impairments: abnormal or restricted ROM, activity intolerance, impaired physical strength and pain with function    Assessment details: Problem List:  1) R shoulder hypomobility  2) R shoulder strength impairment    Dawit Mckeon is a pleasant 40 y.o. female who presents with neck pain/stiffness, headache, dizziness, and R shoulder pain. She has improved significantly with cervical spine hypomobility, R shoulder hypomobility and pain with movement, and R shoulder strength impairments. There are still impairments in strength and mobility resulting in difficulty reaching behind her back. Skilled PT services are still indicated to address impairments, however pt requests discharge due to achieving her goals. Encouraged pt to follow up if ever necessary.  Treatment today was focused on education regarding neck maintenance exercises and R shoulder HEP. Continued to decrease pain in 90/90 shoulder IR with repeated flexion motions. All treatments tolerated well, no adverse response.    Comparable signs:  1) Reaching overhead  2) Reaching behind back    Goals  Short term (3 weeks)  1. Pt will perform prescribed HEP independently. -- met 100%  2. Pt will be able to manage symptoms independently. -- met 100%  Long term (20 weeks)  3. Pt will report 75% or greater decrease in headache frequency and severity -- met 90%  4. Pt will report ability to work a full day with no increase in neck and shoulder pain. -- progressing 75%  5. Pt  will demo ability to reach behind her back with RUE without shoulder pain. -- progressing 75%    Plan  Patient would benefit from: skilled physical therapy and home program  Planned modality interventions: unattended electrical stimulation, thermotherapy: hydrocollator packs, cryotherapy and biofeedback    Planned therapy interventions: abdominal trunk stabilization, joint mobilization, manual therapy, neuromuscular re-education, strengthening, flexibility, stretching, therapeutic activities, functional ROM exercises, therapeutic exercise and home exercise program    Frequency: 1-2x week  Duration in weeks: 12  Plan of Care beginning date: 2025  Plan of Care expiration date: 2025  Treatment plan discussed with: patient      Subjective Evaluation    History of Present Illness  Mechanism of injury: Patient is a 40 y.o. Female who reports to OP PT for re-evaluation and treatment of neck pain/stiffness, dizziness, headache, and R shoulder pain. She reports that neck pain and dizziness have completely resolved and she gets occasional headaches (suboccipital and on crown of head) that reduce with repeated cervical retraction. She reports that headaches are also eased when she uses a thinner pillow to sleep. Neck stiffness is present after long days of work. She reports that she received an injection to the R shoulder, which has decreased pain levels significantly.   Pain  Current pain ratin  At best pain ratin  At worst pain ratin  Quality: pressure, dull ache, sharp and knife-like          Objective      AROM R L   Cervical spine flexion WNL   Cervical spine extension WNL   Cervical spine rotation WNL WNL   Cervical spine side bend WNL WNL   Cervical spine protraction WNL   Cervical spine retraction WNL      MMT R L   Shoulder flexion 4+ 5   Shoulder extension     Shoulder abduction 4 5   Shoulder adduction     Shoulder ER 4+ 5   Shoulder IR 5 5   Elbow flexion 5 5   Elbow extension 5 5   Supination      Pronation     Wrist flexion 5 5   Wrist extension 5               5    Wrist radial deviation     Wrist ulnar deviation       AROM R L   Shoulder flexion 165 160   Shoulder extension     Shoulder abduction 180 180   Shoulder adduction     Shoulder ER 80 85   Shoulder IR 90/90  20* shoulder pain start, 60* shoulder pain after 85   Elbow flexion     Elbow extension     Supination     Pronation     Wrist flexion     Wrist extension     Wrist radial deviation     Wrist ulnar deviation        Functional IR reach: limited 25% R, concordant shoulder pain, WNL L  Functional ER reach: WNL R, WNL L         Precautions: vertigo  Manuals 2/18 2/25 3/4 3/11 3/18 3/25 4/1 4/15   STM       Cx suboccipitals, paraspinals                                     Neuro Re-Ed  Postural ed and progression x 10         Scapula retraction           Prone shoulder Y           Prone shoulder T           Shoulder D2 flexion    Tband (yellow)3x10                                        Ther Ex           Cx retraction Sitting  X10  2x10 w/ pt OP, x15 w/ pt OP Retraction 2 x 10, Sustained 2 x 1 min better.       2x10    supine  x10    Cx retraction+extension    X10,  2x10 w/ pt OP   2x10    R shoulder extension   Repeated, 2x10 w/ IR bias, x10 w/ ER bias        R shoulder flexion   Repeated x10, x15 W/ Tband x10;    2x10 ballistic Finger ladder 2x15, 2x15 IR;    Band IR 2x15;  ER 2x15;    Neutral, greater abduction 2x15 All with IR bias:    Band overhead 2x15, 2x15 ballistic    Supine AROM w/ clinician OP 2x15 Seated x10 w/ clinician OP    X15 w/ pt OP Band overhead 4x15 (better)  Cue for increased shoulder abduction    X5 w/ clinician OP   Row   10# x15 (produced R shoulder pain)        Resisted shoulder flexion     3# 3x10      Retraction + Ext  6 x 5         Cx flexion+rotation       X10 B               Edu Re: objective findings, POC, HEP   Re: objective findings, HEP    Re: objective findings, HEP   Ther Activity                                  Gait Training                                 Modalities

## 2025-04-23 ENCOUNTER — OFFICE VISIT (OUTPATIENT)
Dept: SURGERY | Facility: CLINIC | Age: 40
End: 2025-04-23

## 2025-04-23 VITALS
DIASTOLIC BLOOD PRESSURE: 60 MMHG | BODY MASS INDEX: 26.8 KG/M2 | TEMPERATURE: 98.1 F | HEIGHT: 64 IN | OXYGEN SATURATION: 100 % | SYSTOLIC BLOOD PRESSURE: 110 MMHG | WEIGHT: 157 LBS | HEART RATE: 66 BPM

## 2025-04-23 DIAGNOSIS — L72.3 SEBACEOUS CYST: Primary | ICD-10-CM

## 2025-04-23 PROCEDURE — 99024 POSTOP FOLLOW-UP VISIT: CPT | Performed by: SURGERY

## 2025-04-23 NOTE — ASSESSMENT & PLAN NOTE
Patient is doing well.  The pathology result shows epidermoid cyst.  Follow-up with me as needed.

## 2025-04-23 NOTE — PROGRESS NOTES
"Name: Dawit Mckeon      : 1985      MRN: [id number]  Encounter Provider: Brodie Valdez MD  Encounter Date: 2025   Encounter department: [facility] [place]  :  Assessment & Plan  Sebaceous cyst       Patient is doing well.  The pathology result shows epidermoid cyst.  Follow-up with me as needed.      History of Present Illness   40-year-old female patient who is status post excision of the cyst from her back x 2.  She has no complaints.      Dawit Mckeon is a 40 y.o. female who presents   History obtained from: patient    Review of Systems   All other systems reviewed and are negative.         Objective   /60 (BP Location: Left arm, Patient Position: Sitting, Cuff Size: Standard)   Pulse 66   Temp 98.1 °F (36.7 °C) (Tympanic)   Ht 5' 4\" (1.626 m)   Wt 71.2 kg (157 lb)   SpO2 100%   BMI 26.95 kg/m²      Physical Exam  Vitals reviewed.   Constitutional:       Appearance: Normal appearance.   Skin:     Comments: Both the incisions are healing well.  No signs of infection.   Neurological:      Mental Status: She is alert.           "

## 2025-05-21 ENCOUNTER — APPOINTMENT (EMERGENCY)
Dept: RADIOLOGY | Facility: HOSPITAL | Age: 40
End: 2025-05-21
Payer: COMMERCIAL

## 2025-05-21 ENCOUNTER — HOSPITAL ENCOUNTER (EMERGENCY)
Facility: HOSPITAL | Age: 40
Discharge: HOME | End: 2025-05-21
Attending: INTERNAL MEDICINE
Payer: COMMERCIAL

## 2025-05-21 VITALS
RESPIRATION RATE: 18 BRPM | WEIGHT: 158 LBS | SYSTOLIC BLOOD PRESSURE: 106 MMHG | OXYGEN SATURATION: 97 % | HEIGHT: 61 IN | DIASTOLIC BLOOD PRESSURE: 65 MMHG | BODY MASS INDEX: 29.83 KG/M2 | HEART RATE: 87 BPM | TEMPERATURE: 100.2 F

## 2025-05-21 DIAGNOSIS — U07.1 COVID-19: Primary | ICD-10-CM

## 2025-05-21 LAB
ALBUMIN SERPL BCG-MCNC: 4.1 G/DL (ref 3.5–5)
ALP SERPL-CCNC: 45 U/L (ref 34–104)
ALT SERPL W P-5'-P-CCNC: 26 U/L (ref 7–52)
ANION GAP SERPL CALCULATED.3IONS-SCNC: 7 MMOL/L (ref 4–13)
APTT PPP: 30 SECONDS (ref 23–34)
AST SERPL W P-5'-P-CCNC: 25 U/L (ref 13–39)
BACTERIA UR QL AUTO: ABNORMAL /HPF
BASOPHILS # BLD AUTO: 0.01 THOUSANDS/ÂΜL (ref 0–0.1)
BASOPHILS NFR BLD AUTO: 0 % (ref 0–1)
BILIRUB SERPL-MCNC: 0.3 MG/DL (ref 0.2–1)
BILIRUB UR QL STRIP: NEGATIVE
BUN SERPL-MCNC: 11 MG/DL (ref 5–25)
CALCIUM SERPL-MCNC: 8.8 MG/DL (ref 8.4–10.2)
CHLORIDE SERPL-SCNC: 100 MMOL/L (ref 96–108)
CLARITY UR: CLEAR
CO2 SERPL-SCNC: 27 MMOL/L (ref 21–32)
COLOR UR: YELLOW
CREAT SERPL-MCNC: 0.71 MG/DL (ref 0.6–1.3)
EOSINOPHIL # BLD AUTO: 0.01 THOUSAND/ÂΜL (ref 0–0.61)
EOSINOPHIL NFR BLD AUTO: 0 % (ref 0–6)
ERYTHROCYTE [DISTWIDTH] IN BLOOD BY AUTOMATED COUNT: 13.3 % (ref 11.6–15.1)
EXT PREGNANCY TEST URINE: NEGATIVE
EXT. CONTROL: NORMAL
FLUAV AG UPPER RESP QL IA.RAPID: NEGATIVE
FLUBV AG UPPER RESP QL IA.RAPID: NEGATIVE
GFR SERPL CREATININE-BSD FRML MDRD: 107 ML/MIN/1.73SQ M
GLUCOSE SERPL-MCNC: 112 MG/DL (ref 65–140)
GLUCOSE UR STRIP-MCNC: NEGATIVE MG/DL
HCT VFR BLD AUTO: 37.3 % (ref 34.8–46.1)
HGB BLD-MCNC: 11.9 G/DL (ref 11.5–15.4)
HGB UR QL STRIP.AUTO: ABNORMAL
IMM GRANULOCYTES # BLD AUTO: 0.02 THOUSAND/UL (ref 0–0.2)
IMM GRANULOCYTES NFR BLD AUTO: 0 % (ref 0–2)
INR PPP: 1.08 (ref 0.85–1.19)
KETONES UR STRIP-MCNC: NEGATIVE MG/DL
LACTATE SERPL-SCNC: 0.9 MMOL/L (ref 0.5–2)
LEUKOCYTE ESTERASE UR QL STRIP: NEGATIVE
LYMPHOCYTES # BLD AUTO: 0.75 THOUSANDS/ÂΜL (ref 0.6–4.47)
LYMPHOCYTES NFR BLD AUTO: 14 % (ref 14–44)
MCH RBC QN AUTO: 29.7 PG (ref 26.8–34.3)
MCHC RBC AUTO-ENTMCNC: 31.9 G/DL (ref 31.4–37.4)
MCV RBC AUTO: 93 FL (ref 82–98)
MONOCYTES # BLD AUTO: 0.52 THOUSAND/ÂΜL (ref 0.17–1.22)
MONOCYTES NFR BLD AUTO: 10 % (ref 4–12)
NEUTROPHILS # BLD AUTO: 4.19 THOUSANDS/ÂΜL (ref 1.85–7.62)
NEUTS SEG NFR BLD AUTO: 76 % (ref 43–75)
NITRITE UR QL STRIP: NEGATIVE
NON-SQ EPI CELLS URNS QL MICRO: ABNORMAL /HPF
NRBC BLD AUTO-RTO: 0 /100 WBCS
PH UR STRIP.AUTO: 6 [PH]
PLATELET # BLD AUTO: 236 THOUSANDS/UL (ref 149–390)
PMV BLD AUTO: 10.8 FL (ref 8.9–12.7)
POTASSIUM SERPL-SCNC: 3.7 MMOL/L (ref 3.5–5.3)
PROCALCITONIN SERPL-MCNC: 0.06 NG/ML
PROT SERPL-MCNC: 7.2 G/DL (ref 6.4–8.4)
PROT UR STRIP-MCNC: NEGATIVE MG/DL
PROTHROMBIN TIME: 14.4 SECONDS (ref 12.3–15)
RBC # BLD AUTO: 4.01 MILLION/UL (ref 3.81–5.12)
RBC #/AREA URNS AUTO: ABNORMAL /HPF
S PYO DNA THROAT QL NAA+PROBE: NOT DETECTED
SARS-COV+SARS-COV-2 AG RESP QL IA.RAPID: POSITIVE
SODIUM SERPL-SCNC: 134 MMOL/L (ref 135–147)
SP GR UR STRIP.AUTO: 1.01 (ref 1–1.03)
UROBILINOGEN UR QL STRIP.AUTO: 0.2 E.U./DL
WBC # BLD AUTO: 5.5 THOUSAND/UL (ref 4.31–10.16)
WBC #/AREA URNS AUTO: ABNORMAL /HPF

## 2025-05-21 PROCEDURE — 96374 THER/PROPH/DIAG INJ IV PUSH: CPT

## 2025-05-21 PROCEDURE — 71045 X-RAY EXAM CHEST 1 VIEW: CPT

## 2025-05-21 PROCEDURE — 96361 HYDRATE IV INFUSION ADD-ON: CPT

## 2025-05-21 PROCEDURE — 87811 SARS-COV-2 COVID19 W/OPTIC: CPT | Performed by: INTERNAL MEDICINE

## 2025-05-21 PROCEDURE — 99285 EMERGENCY DEPT VISIT HI MDM: CPT | Performed by: INTERNAL MEDICINE

## 2025-05-21 PROCEDURE — 81001 URINALYSIS AUTO W/SCOPE: CPT | Performed by: INTERNAL MEDICINE

## 2025-05-21 PROCEDURE — 85025 COMPLETE CBC W/AUTO DIFF WBC: CPT | Performed by: INTERNAL MEDICINE

## 2025-05-21 PROCEDURE — 87804 INFLUENZA ASSAY W/OPTIC: CPT | Performed by: INTERNAL MEDICINE

## 2025-05-21 PROCEDURE — 99283 EMERGENCY DEPT VISIT LOW MDM: CPT

## 2025-05-21 PROCEDURE — 83605 ASSAY OF LACTIC ACID: CPT | Performed by: INTERNAL MEDICINE

## 2025-05-21 PROCEDURE — 84145 PROCALCITONIN (PCT): CPT | Performed by: INTERNAL MEDICINE

## 2025-05-21 PROCEDURE — 36415 COLL VENOUS BLD VENIPUNCTURE: CPT | Performed by: INTERNAL MEDICINE

## 2025-05-21 PROCEDURE — 81025 URINE PREGNANCY TEST: CPT | Performed by: INTERNAL MEDICINE

## 2025-05-21 PROCEDURE — 87040 BLOOD CULTURE FOR BACTERIA: CPT | Performed by: INTERNAL MEDICINE

## 2025-05-21 PROCEDURE — 80053 COMPREHEN METABOLIC PANEL: CPT | Performed by: INTERNAL MEDICINE

## 2025-05-21 PROCEDURE — 87651 STREP A DNA AMP PROBE: CPT | Performed by: INTERNAL MEDICINE

## 2025-05-21 PROCEDURE — 85730 THROMBOPLASTIN TIME PARTIAL: CPT | Performed by: INTERNAL MEDICINE

## 2025-05-21 PROCEDURE — 85610 PROTHROMBIN TIME: CPT | Performed by: INTERNAL MEDICINE

## 2025-05-21 PROCEDURE — 81003 URINALYSIS AUTO W/O SCOPE: CPT | Performed by: INTERNAL MEDICINE

## 2025-05-21 RX ORDER — KETOROLAC TROMETHAMINE 30 MG/ML
15 INJECTION, SOLUTION INTRAMUSCULAR; INTRAVENOUS ONCE
Status: COMPLETED | OUTPATIENT
Start: 2025-05-21 | End: 2025-05-21

## 2025-05-21 RX ORDER — IBUPROFEN 600 MG/1
600 TABLET, FILM COATED ORAL ONCE
Status: COMPLETED | OUTPATIENT
Start: 2025-05-21 | End: 2025-05-21

## 2025-05-21 RX ADMIN — SODIUM CHLORIDE 1000 ML: 0.9 INJECTION, SOLUTION INTRAVENOUS at 12:53

## 2025-05-21 RX ADMIN — IBUPROFEN 600 MG: 600 TABLET ORAL at 14:21

## 2025-05-21 RX ADMIN — KETOROLAC TROMETHAMINE 15 MG: 30 INJECTION, SOLUTION INTRAMUSCULAR at 12:55

## 2025-05-21 NOTE — ED PROVIDER NOTES
Time reflects when diagnosis was documented in both MDM as applicable and the Disposition within this note       Time User Action Codes Description Comment    5/21/2025  2:13 PM NerissaNandini watson Add [U07.1] COVID-19           ED Disposition       ED Disposition   Discharge    Condition   Stable    Date/Time   Wed May 21, 2025  2:13 PM    Comment   Dawit Mckeon discharge to home/self care.                   Assessment & Plan       Medical Decision Making  And this is a 40 years old came for having fever for 3 days with headache and generalized body ache.  Patient keep taking Tylenol and Motrin and NyQuil but she still has severe body ache.  Patient did not take COVID or flu vaccine this year.  Patient denies SOB, CP, abdominal pain, nausea vomiting diarrhea.  Patient denies urinary symptoms.  Patient denies flank pain.  Patient has temp 100.2 F at the ER and has pulse ox 100% on room air.  Patient has no medical history and medication except OTC ones physical exam shows no pertinent positive findings.  CXR shows no acute cardiopulmonary findings.  Reviewing the labs including CBC, CMP are within normal limits.  Tested for COVID/flu Came back positive for COVID.  Tested for strep a PCR is undetected.  Lactic acid 0.9, procalcitonin 0.06.  Case discussed with the patient told her that she has COVID and she need to do self quarantine for 5 days.  And take OTC NSAID or Motrin.  Differential diagnoses include but not limited to; COVID/flu/URI, viral syndrome, CVA.    Amount and/or Complexity of Data Reviewed  Labs: ordered.     Details: Reviewing the labs including CBC, CMP are within normal limits.  Tested for COVID/flu Came back positive for COVID.  Lactic acid 0.9, procalcitonin 0.06.    Radiology: ordered and independent interpretation performed.     Details: CXR no acute cardiopulmonary findings.    Risk  Prescription drug management.             Medications   sodium chloride 0.9 % bolus 1,000 mL (0 mL Intravenous  Stopped 5/21/25 1425)   ketorolac (TORADOL) injection 15 mg (15 mg Intravenous Given 5/21/25 1255)   ibuprofen (MOTRIN) tablet 600 mg (600 mg Oral Given 5/21/25 1421)       ED Risk Strat Scores                    No data recorded        SBIRT 22yo+      Flowsheet Row Most Recent Value   Initial Alcohol Screen: US AUDIT-C     1. How often do you have a drink containing alcohol? 0 Filed at: 05/21/2025 2424   2. How many drinks containing alcohol do you have on a typical day you are drinking?  0 Filed at: 05/21/2025 6325   3b. FEMALE Any Age, or MALE 65+: How often do you have 4 or more drinks on one occassion? 0 Filed at: 05/21/2025 7955   Audit-C Score 0 Filed at: 05/21/2025 9943   YANNI: How many times in the past year have you...    Used an illegal drug or used a prescription medication for non-medical reasons? Never Filed at: 05/21/2025 5523                            History of Present Illness       Chief Complaint   Patient presents with    Headache     Pt reports having a fever and headache for the last couple days with cold symptoms, sore throat,nausea denies vomiting and diarrhea. Took tylenol and Advil this morning       Past Medical History:   Diagnosis Date    Migraines       Past Surgical History:   Procedure Laterality Date    NO PAST SURGERIES      WY EXC B9 LESION MRGN XCP SK TG T/A/L 1.1-2.0 CM N/A 4/10/2025    Procedure: EXCISION  BIOPSY LESION/MASS BACK x2;  Surgeon: Brodie Valdez MD;  Location: [facility];  Service: General    WY EXC B9 LESION MRGN XCP SK TG T/A/L 3.1-4.0 CM N/A 4/10/2025    Procedure: EXCISION  BIOPSY LESION/MASS BACK x2;  Surgeon: Brodie Valdez MD;  Location: [facility];  Service: General    WY EXC B9 LESION MRGN XCP SK TG T/A/L 3.1-4.0 CM Left 4/10/2025    Procedure: EXCISION BIOPSY TISSUE LESION/MASS THIGH/ LOWER EXTREMITY;  Surgeon: Brodie Valdez MD;  Location: [facility];  Service: General      Family History   Problem Relation Name Age of Onset    Diabetes Mother       Diabetes Father        Social History[1]   E-Cigarette/Vaping    E-Cigarette Use Never User       E-Cigarette/Vaping Substances    Nicotine No     THC No     CBD No     Flavoring No     Other No     Unknown No       I have reviewed and agree with the history as documented.     This is a 40 years old came in for having fever and headache for 3 days.  Patient states that she spiked fever up to 102F.  Patient has extensive body ache and headache.  Patient did not take COVID or flu vaccine this year.  Patient stated that her daughter is also sick.  Patient keep take Tylenol and NyQuil.  Patient denies SOB, cough, abdominal pain, nausea vomiting diarrhea.  Patient denies any urinary symptoms.  LMP is 5/7/2025.  Patient denies medical history and denies surgical history.  Patient is tachycardic and he has temp of 100.2 F at the ER, but patient took Tylenol followed by Motrin at home before she came to the ER.      History provided by:  Patient   used: No    Headache  Pain location:  Generalized  Radiates to:  Does not radiate  Severity currently:  7/10  Severity at highest:  7/10  Onset quality:  Unable to specify  Timing:  Intermittent  Progression:  Unchanged  Associated symptoms: no abdominal pain, no back pain, no cough, no dizziness, no ear pain, no eye pain, no fever, no numbness, no seizures, no sore throat, no vomiting and no weakness        Review of Systems   Constitutional:  Negative for chills and fever.   HENT:  Negative for ear pain and sore throat.    Eyes:  Negative for pain and visual disturbance.   Respiratory:  Negative for apnea, cough and shortness of breath.    Cardiovascular:  Negative for chest pain and palpitations.   Gastrointestinal:  Negative for abdominal pain and vomiting.   Genitourinary:  Negative for dysuria and hematuria.   Musculoskeletal:  Negative for arthralgias and back pain.   Skin:  Negative for color change and rash.   Neurological:  Positive for headaches.  Negative for dizziness, tremors, seizures, syncope, weakness, light-headedness and numbness.   Hematological:  Negative for adenopathy. Does not bruise/bleed easily.   All other systems reviewed and are negative.          Objective       ED Triage Vitals   Temperature Pulse Blood Pressure Respirations SpO2 Patient Position - Orthostatic VS   05/21/25 1223 05/21/25 1223 05/21/25 1223 05/21/25 1223 05/21/25 1223 05/21/25 1223   100.2 °F (37.9 °C) 103 123/77 18 99 % Lying      Temp Source Heart Rate Source BP Location FiO2 (%) Pain Score    05/21/25 1223 05/21/25 1223 05/21/25 1223 -- 05/21/25 1255    Oral Monitor Left arm  8      Vitals      Date and Time Temp Pulse SpO2 Resp BP Pain Score FACES Pain Rating User   05/21/25 1422 -- 87 97 % -- 106/65 -- -- SD   05/21/25 1421 -- -- -- -- -- 8 -- SD   05/21/25 1400 -- 88 97 % -- 103/63 -- -- SD   05/21/25 1300 -- 93 98 % -- 125/76 -- -- SD   05/21/25 1256 -- 94 100 % -- 121/73 8 -- SD   05/21/25 1255 -- -- -- -- -- 8 -- SD   05/21/25 1223 100.2 °F (37.9 °C) 103 99 % 18 123/77 -- -- CF            Physical Exam  Vitals and nursing note reviewed.   Constitutional:       General: She is not in acute distress.     Appearance: Normal appearance. She is well-developed. She is not ill-appearing, toxic-appearing or diaphoretic.   HENT:      Head: Normocephalic and atraumatic.      Right Ear: Tympanic membrane and ear canal normal.      Left Ear: Tympanic membrane and ear canal normal.      Nose: Nose normal. No congestion or rhinorrhea.      Mouth/Throat:      Mouth: Mucous membranes are moist.      Pharynx: No oropharyngeal exudate or posterior oropharyngeal erythema.     Eyes:      Extraocular Movements: Extraocular movements intact.      Conjunctiva/sclera: Conjunctivae normal.      Pupils: Pupils are equal, round, and reactive to light.     Neck:      Vascular: No carotid bruit.     Cardiovascular:      Rate and Rhythm: Normal rate and regular rhythm.      Heart sounds: No  murmur heard.     No friction rub. No gallop.   Pulmonary:      Effort: Pulmonary effort is normal. No respiratory distress.      Breath sounds: Normal breath sounds. No stridor. No wheezing, rhonchi or rales.   Chest:      Chest wall: No tenderness.   Abdominal:      General: Abdomen is flat. There is no distension.      Palpations: Abdomen is soft. There is no mass.      Tenderness: There is no abdominal tenderness. There is no right CVA tenderness, left CVA tenderness, guarding or rebound.      Hernia: No hernia is present.     Musculoskeletal:         General: No swelling, tenderness, deformity or signs of injury.      Cervical back: Normal range of motion and neck supple. No rigidity or tenderness.      Right lower leg: No edema.      Left lower leg: No edema.   Lymphadenopathy:      Cervical: No cervical adenopathy.     Skin:     General: Skin is warm and dry.      Capillary Refill: Capillary refill takes less than 2 seconds.      Coloration: Skin is not jaundiced or pale.      Findings: No bruising, erythema, lesion or rash.     Neurological:      General: No focal deficit present.      Mental Status: She is alert and oriented to person, place, and time.     Psychiatric:         Mood and Affect: Mood normal.         Results Reviewed       Procedure Component Value Units Date/Time    Urine Microscopic [[id number]]  (Abnormal) Collected: 05/21/25 1401    Lab Status: Final result Specimen: Urine, Clean Catch Updated: 05/21/25 1436     RBC, UA 0-5 /hpf      WBC, UA 0-5 /hpf      Epithelial Cells Moderate /hpf      Bacteria, UA Moderate /hpf     UA w Reflex to Microscopic w Reflex to Culture [[id number]]  (Abnormal) Collected: 05/21/25 1401    Lab Status: Final result Specimen: Urine, Clean Catch Updated: 05/21/25 1424     Color, UA Yellow     Clarity, UA Clear     Specific Gravity, UA 1.010     pH, UA 6.0     Leukocytes, UA Negative     Nitrite, UA Negative     Protein, UA Negative mg/dl      Glucose, UA Negative  mg/dl      Ketones, UA Negative mg/dl      Urobilinogen, UA 0.2 E.U./dl      Bilirubin, UA Negative     Occult Blood, UA 2+    POCT pregnancy, urine [[id number]]  (Normal) Collected: 05/21/25 1347    Lab Status: Final result Updated: 05/21/25 1347     EXT Preg Test, Ur Negative     Control Valid    Strep A PCR [[id number]]  (Normal) Collected: 05/21/25 1247    Lab Status: Final result Specimen: Throat Updated: 05/21/25 1333     STREP A PCR Not Detected    Procalcitonin [[id number]]  (Normal) Collected: 05/21/25 1247    Lab Status: Final result Specimen: Blood from Arm, Left Updated: 05/21/25 1323     Procalcitonin 0.06 ng/ml     FLU/COVID Rapid Antigen (30 min. TAT) - Preferred screening test in ED [[id number]]  (Abnormal) Collected: 05/21/25 1247    Lab Status: Final result Specimen: Nares from Nose Updated: 05/21/25 1315     SARS COV Rapid Antigen Positive     Influenza A Rapid Antigen Negative     Influenza B Rapid Antigen Negative    Narrative:      This test has been performed using the [employer] Arely 2 FLU+SARS Antigen test under the Emergency Use Authorization (EUA). This test has been validated by the  and verified by the performing laboratory. The Arely uses lateral flow immunofluorescent sandwich assay to detect SARS-COV, Influenza A and Influenza B Antigen.     The Quidel Arely 2 SARS Antigen test does not differentiate between SARS-CoV and SARS-CoV-2.     Negative results are presumptive and may be confirmed with a molecular assay, if necessary, for patient management. Negative results do not rule out SARS-CoV-2 or influenza infection and should not be used as the sole basis for treatment or patient management decisions. A negative test result may occur if the level of antigen in a sample is below the limit of detection of this test.     Positive results are indicative of the presence of viral antigens, but do not rule out bacterial infection or co-infection with other viruses.     All test results  should be used as an adjunct to clinical observations and other information available to the provider.    FOR PEDIATRIC PATIENTS - copy/paste COVID Guidelines URL to browser: [email or web address]    Comprehensive metabolic panel [[id number]]  (Abnormal) Collected: 05/21/25 1247    Lab Status: Final result Specimen: Blood from Arm, Left Updated: 05/21/25 1313     Sodium 134 mmol/L      Potassium 3.7 mmol/L      Chloride 100 mmol/L      CO2 27 mmol/L      ANION GAP 7 mmol/L      BUN 11 mg/dL      Creatinine 0.71 mg/dL      Glucose 112 mg/dL      Calcium 8.8 mg/dL      AST 25 U/L      ALT 26 U/L      Alkaline Phosphatase 45 U/L      Total Protein 7.2 g/dL      Albumin 4.1 g/dL      Total Bilirubin 0.30 mg/dL      eGFR 107 ml/min/1.73sq m     Narrative:      National Kid[employer] guidelines for Chronic Kidney Disease (CKD):     Stage 1 with normal or high GFR (GFR > 90 mL/min/1.73 square meters)    Stage 2 Mild CKD (GFR = 60-89 mL/min/1.73 square meters)    Stage 3A Moderate CKD (GFR = 45-59 mL/min/1.73 square meters)    Stage 3B Moderate CKD (GFR = 30-44 mL/min/1.73 square meters)    Stage 4 Severe CKD (GFR = 15-29 mL/min/1.73 square meters)    Stage 5 End Stage CKD (GFR <15 mL/min/1.73 square meters)  Note: GFR calculation is accurate only with a steady state creatinine    Lactic acid [[id number]]  (Normal) Collected: 05/21/25 1247    Lab Status: Final result Specimen: Blood from Arm, Left Updated: 05/21/25 1310     LACTIC ACID 0.9 mmol/L     Narrative:      Result may be elevated if tourniquet was used during collection.    Protime-INR [[id number]]  (Normal) Collected: 05/21/25 1247    Lab Status: Final result Specimen: Blood from Arm, Left Updated: 05/21/25 1306     Protime 14.4 seconds      INR 1.08    Narrative:      INR Therapeutic Range    Indication                                             INR Range      Atrial Fibrillation                                                2.0-3.0  Hypercoagulable State                                    2.0.2.3  Left Ventricular Asist Device                            2.0-3.0  Mechanical Heart Valve                                  -    Aortic(with afib, MI, embolism, HF, LA enlargement,    and/or coagulopathy)                                     2.0-3.0 (2.5-3.5)     Mitral                                                             2.5-3.5  Prosthetic/Bioprosthetic Heart Valve               2.0-3.0  Venous thromboembolism (VTE: VT, PE        2.0-3.0    APTT [[id number]]  (Normal) Collected: 05/21/25 1247    Lab Status: Final result Specimen: Blood from Arm, Left Updated: 05/21/25 1306     PTT 30 seconds     CBC and differential [[id number]]  (Abnormal) Collected: 05/21/25 1247    Lab Status: Final result Specimen: Blood from Arm, Left Updated: 05/21/25 1256     WBC 5.50 Thousand/uL      RBC 4.01 Million/uL      Hemoglobin 11.9 g/dL      Hematocrit 37.3 %      MCV 93 fL      MCH 29.7 pg      MCHC 31.9 g/dL      RDW 13.3 %      MPV 10.8 fL      Platelets 236 Thousands/uL      nRBC 0 /100 WBCs      Segmented % 76 %      Immature Grans % 0 %      Lymphocytes % 14 %      Monocytes % 10 %      Eosinophils Relative 0 %      Basophils Relative 0 %      Absolute Neutrophils 4.19 Thousands/µL      Absolute Immature Grans 0.02 Thousand/uL      Absolute Lymphocytes 0.75 Thousands/µL      Absolute Monocytes 0.52 Thousand/µL      Eosinophils Absolute 0.01 Thousand/µL      Basophils Absolute 0.01 Thousands/µL     Blood culture #1 [[id number]] Collected: 05/21/25 1247    Lab Status: In process Specimen: Blood from Arm, Right Updated: 05/21/25 1252    Blood culture #2 [[id number]] Collected: 05/21/25 1247    Lab Status: In process Specimen: Blood from Arm, Left Updated: 05/21/25 1252            XR chest portable   ED Interpretation by Nandini Quinonez MD (05/21 1303)   CXR; NO acute cardiopulmonary findings.      Final Interpretation by  Najma Hill MD (05/21 1326)      Patchy opacities in the lung bases which could represent atelectasis or pneumonia in the right clinical setting.            Workstation performed: [id number]             Procedures    ED Medication and Procedure Management   Prior to Admission Medications   Prescriptions Last Dose Informant Patient Reported? Taking?   Cholecalciferol (VITAMIN D3) 1,000 units tablet   Yes No   Sig: Take 1,000 Units by mouth daily   SUMAtriptan (Imitrex) 100 mg tablet   No No   Sig: Use 100mg at onset of migraine and  may repeat x 1 only in 2 hours. No more than 2 tablets in 24 hours   medroxyPROGESTERone (PROVERA) 10 mg tablet   No No   Sig: Take 1 tablet (10 mg total) by mouth daily   propranolol (INDERAL LA) 80 mg 24 hr capsule   No No   Sig: Take 1 capsule (80 mg total) by mouth daily at bedtime   vitamin B-12 (CYANOCOBALAMIN) 100 MCG tablet   Yes No   Sig: Take 100 mcg by mouth daily      Facility-Administered Medications: None     Discharge Medication List as of 5/21/2025  2:16 PM        CONTINUE these medications which have NOT CHANGED    Details   Cholecalciferol (VITAMIN D3) 1,000 units tablet Take 1,000 Units by mouth daily, Historical Med      medroxyPROGESTERone (PROVERA) 10 mg tablet Take 1 tablet (10 mg total) by mouth daily, Starting Mon 3/10/2025, Normal      propranolol (INDERAL LA) 80 mg 24 hr capsule Take 1 capsule (80 mg total) by mouth daily at bedtime, Starting Wed 2/12/2025, Normal      SUMAtriptan (Imitrex) 100 mg tablet Use 100mg at onset of migraine and  may repeat x 1 only in 2 hours. No more than 2 tablets in 24 hours, Normal      vitamin B-12 (CYANOCOBALAMIN) 100 MCG tablet Take 100 mcg by mouth daily, Historical Med           No discharge procedures on file.  ED SEPSIS DOCUMENTATION   Time reflects when diagnosis was documented in both MDM as applicable and the Disposition within this note       Time User Action Codes Description Comment    5/21/2025  2:13 PM  Nandini Quinonez Add [U07.1] COVID-19                      [1]   Social History  Tobacco Use    Smoking status: Never    Smokeless tobacco: Never   Vaping Use    Vaping status: Never Used   Substance Use Topics    Alcohol use: Never    Drug use: Never        Nandini Quinonez MD  05/21/25 1553

## 2025-05-21 NOTE — DISCHARGE INSTRUCTIONS
Follow-up with your PMD.  Take Motrin for body ache and for headache and fever.  Self quarantine for 5 days try to avoid family members so they will not get infected.  Labs Reviewed   COVID-19/INFLUENZA A/B RAPID ANTIGEN (30 MIN.TAT) - Abnormal       Result Value Ref Range Status    SARS COV Rapid Antigen Positive (*) Negative Final    Influenza A Rapid Antigen Negative  Negative Final    Influenza B Rapid Antigen Negative  Negative Final    Narrative:     This test has been performed using the Milo Arely 2 FLU+SARS Antigen test under the Emergency Use Authorization (EUA). This test has been validated by the  and verified by the performing laboratory. The Arely uses lateral flow immunofluorescent sandwich assay to detect SARS-COV, Influenza A and Influenza B Antigen.     The Quidel Arely 2 SARS Antigen test does not differentiate between SARS-CoV and SARS-CoV-2.     Negative results are presumptive and may be confirmed with a molecular assay, if necessary, for patient management. Negative results do not rule out SARS-CoV-2 or influenza infection and should not be used as the sole basis for treatment or patient management decisions. A negative test result may occur if the level of antigen in a sample is below the limit of detection of this test.     Positive results are indicative of the presence of viral antigens, but do not rule out bacterial infection or co-infection with other viruses.     All test results should be used as an adjunct to clinical observations and other information available to the provider.    FOR PEDIATRIC PATIENTS - copy/paste COVID Guidelines URL to browser: [email or web address]   CBC AND DIFFERENTIAL - Abnormal    WBC 5.50  4.31 - 10.16 Thousand/uL Final    RBC 4.01  3.81 - 5.12 Million/uL Final    Hemoglobin 11.9  11.5 - 15.4 g/dL Final    Hematocrit 37.3  34.8 - 46.1 % Final    MCV 93  82 - 98 fL Final    MCH 29.7  26.8 -  34.3 pg Final    MCHC 31.9  31.4 - 37.4 g/dL Final    RDW 13.3  11.6 - 15.1 % Final    MPV 10.8  8.9 - 12.7 fL Final    Platelets 236  149 - 390 Thousands/uL Final    nRBC 0  /100 WBCs Final    Segmented % 76 (*) 43 - 75 % Final    Immature Grans % 0  0 - 2 % Final    Lymphocytes % 14  14 - 44 % Final    Monocytes % 10  4 - 12 % Final    Eosinophils Relative 0  0 - 6 % Final    Basophils Relative 0  0 - 1 % Final    Absolute Neutrophils 4.19  1.85 - 7.62 Thousands/µL Final    Absolute Immature Grans 0.02  0.00 - 0.20 Thousand/uL Final    Absolute Lymphocytes 0.75  0.60 - 4.47 Thousands/µL Final    Absolute Monocytes 0.52  0.17 - 1.22 Thousand/µL Final    Eosinophils Absolute 0.01  0.00 - 0.61 Thousand/µL Final    Basophils Absolute 0.01  0.00 - 0.10 Thousands/µL Final   COMPREHENSIVE METABOLIC PANEL - Abnormal    Sodium 134 (*) 135 - 147 mmol/L Final    Potassium 3.7  3.5 - 5.3 mmol/L Final    Chloride 100  96 - 108 mmol/L Final    CO2 27  21 - 32 mmol/L Final    ANION GAP 7  4 - 13 mmol/L Final    BUN 11  5 - 25 mg/dL Final    Creatinine 0.71  0.60 - 1.30 mg/dL Final    Comment: Standardized to IDMS reference method    Glucose 112  65 - 140 mg/dL Final    Comment: If the patient is fasting, the ADA then defines impaired fasting glucose as > 100 mg/dL and diabetes as > or equal to 123 mg/dL.    Calcium 8.8  8.4 - 10.2 mg/dL Final    AST 25  13 - 39 U/L Final    ALT 26  7 - 52 U/L Final    Comment: Specimen collection should occur prior to Sulfasalazine administration due to the potential for falsely depressed results.     Alkaline Phosphatase 45  34 - 104 U/L Final    Total Protein 7.2  6.4 - 8.4 g/dL Final    Albumin 4.1  3.5 - 5.0 g/dL Final    Total Bilirubin 0.30  0.20 - 1.00 mg/dL Final    Comment: Use of this assay is not recommended for patients undergoing treatment with eltrombopag due to the potential for falsely elevated results.  N-acetyl-p-benzoquinone imine (metabolite of Acetaminophen) will generate  erroneously low results in samples for patients that have taken an overdose of Acetaminophen.    eGFR 107  ml/min/1.73sq m Final    Narrative:     National Kid[employer] guidelines for Chronic Kidney Disease (CKD):     Stage 1 with normal or high GFR (GFR > 90 mL/min/1.73 square meters)    Stage 2 Mild CKD (GFR = 60-89 mL/min/1.73 square meters)    Stage 3A Moderate CKD (GFR = 45-59 mL/min/1.73 square meters)    Stage 3B Moderate CKD (GFR = 30-44 mL/min/1.73 square meters)    Stage 4 Severe CKD (GFR = 15-29 mL/min/1.73 square meters)    Stage 5 End Stage CKD (GFR <15 mL/min/1.73 square meters)  Note: GFR calculation is accurate only with a steady state creatinine   STREP A PCR - Normal    STREP A PCR Not Detected  Not Detected Final   LACTIC ACID, PLASMA (W/REFLEX IF RESULT > 2.0) - Normal    LACTIC ACID 0.9  0.5 - 2.0 mmol/L Final    Narrative:     Result may be elevated if tourniquet was used during collection.   PROCALCITONIN TEST - Normal    Procalcitonin 0.06  <=0.25 ng/ml Final    Comment: Suspected Lower Respiratory Tract Infection (LRTI):  - LESS than or EQUAL to 0.25 ng/mL:   low likelihood for bacterial LRTI; antibiotics DISCOURAGED.  - GREATER than 0.25 ng/mL:   increased likelihood for bacterial LRTI; antibiotics ENCOURAGED.    Suspected Sepsis:  - Strongly consider initiating antibiotics in ALL UNSTABLE patients.  - LESS than or EQUAL to 0.5 ng/mL:   low likelihood for bacterial sepsis; antibiotics DISCOURAGED.  - GREATER than 0.5 ng/mL:   increased likelihood for bacterial sepsis; antibiotics ENCOURAGED.  - GREATER than 2 ng/mL:   high risk for severe sepsis / septic shock; antibiotics strongly ENCOURAGED.    Decisions on antibiotic use should not be based solely on Procalcitonin (PCT) levels. If PCT is low but uncertainty exists with stopping antibiotics, repeat PCT in 6-24 hours to confirm the low level. If antibiotics are administered (regardless if initial PCT was high or low), repeat  PCT every 1-2 days to consider early antibiotic cessation (when GREATER than 80% decrease from the peak OR when PCT drops below designated cutoffs, whichever comes first), so long as the infection is NOT one that typically requires prolonged treatment durations (e.g., bone/joint infections, endocarditis, Staph. aureus bacteremia).    Situations of FALSE-POSITIVE Procalcitonin values:  1) Newborns < 72 hours old  2) Massive stress from severe trauma / burns, major surgery, acute pancreatitis, cardiogenic / hemorrhagic shock, sickle cell crisis, or other organ perfusion abnormalities  3) Malaria and some Candidal infections  4) Treatment with agents that stimulate cytokines (e.g., OKT3, anti-lymphocyte globulins, alemtuzumab, IL-2, granulocyte transfusion [NOT GCSFs])  5) Chronic renal disease causes elevated baseline levels (consider GREATER than 0.75 ng/mL as an abnormal cut-off); initiating HD/CRRT may cause transient decreases  6) Paraneoplastic syndromes from medullary thyroid or SCLC, some forms of vasculitis, and acute [id number] disease    Situations of FALSE-NEGATIVE Procalcitonin values:  1) Too early in clinical course for PCT to have reached its peak (may repeat in 6-24 hours to confirm low level)  2) Localized infection WITHOUT systemic (SIRS / sepsis) response (e.g., an abscess, osteomyelitis, cystitis)  3) Mycobacteria (e.g., Tuberculosis, MAC)  4) Cystic fibrosis exacerbations     PROTIME-INR - Normal    Protime 14.4  12.3 - 15.0 seconds Final    INR 1.08  0.85 - 1.19 Final    Narrative:     INR Therapeutic Range    Indication                                             INR Range      Atrial Fibrillation                                               2.0-3.0  Hypercoagulable State                                    2.0.2.3  Left Ventricular Asist Device                            2.0-3.0  Mechanical Heart Valve                                  -    Aortic(with afib, MI, embolism, HF, LA  enlargement,    and/or coagulopathy)                                     2.0-3.0 (2.5-3.5)     Mitral                                                             2.5-3.5  Prosthetic/Bioprosthetic Heart Valve               2.0-3.0  Venous thromboembolism (VTE: VT, PE        2.0-3.0   APTT - Normal    PTT 30  23 - 34 seconds Final    Comment: Therapeutic Heparin Range =  60-90 seconds   POCT PREGNANCY, URINE - Normal    EXT Preg Test, Ur Negative  Negative Final    Control Valid  Valid Final   BLOOD CULTURE   BLOOD CULTURE   UA W REFLEX TO MICROSCOPIC WITH REFLEX TO CULTURE     XR chest portable   ED Interpretation   CXR; NO acute cardiopulmonary findings.      Final Result      Patchy opacities in the lung bases which could represent atelectasis or pneumonia in the right clinical setting.            Workstation performed: [id number]           '

## 2025-05-21 NOTE — ED NOTES
Discharge instructions reviewed with pt. Pt verbalized understanding. And has no further questions at this time. Pt ambulatory off unit with steady gait.      Thu Osborn RN  05/21/25 7992

## 2025-05-24 ENCOUNTER — HOSPITAL ENCOUNTER (EMERGENCY)
Facility: HOSPITAL | Age: 40
Discharge: HOME | End: 2025-05-24
Attending: INTERNAL MEDICINE
Payer: COMMERCIAL

## 2025-05-24 VITALS
RESPIRATION RATE: 16 BRPM | SYSTOLIC BLOOD PRESSURE: 129 MMHG | HEART RATE: 57 BPM | OXYGEN SATURATION: 97 % | DIASTOLIC BLOOD PRESSURE: 91 MMHG | TEMPERATURE: 98.3 F

## 2025-05-24 DIAGNOSIS — G43.709 CHRONIC MIGRAINE WITHOUT AURA WITHOUT STATUS MIGRAINOSUS, NOT INTRACTABLE: ICD-10-CM

## 2025-05-24 DIAGNOSIS — G43.809 OTHER MIGRAINE WITHOUT STATUS MIGRAINOSUS, NOT INTRACTABLE: Primary | ICD-10-CM

## 2025-05-24 DIAGNOSIS — N91.3 PRIMARY OLIGOMENORRHEA: ICD-10-CM

## 2025-05-24 PROCEDURE — 99284 EMERGENCY DEPT VISIT MOD MDM: CPT | Performed by: INTERNAL MEDICINE

## 2025-05-24 PROCEDURE — 96374 THER/PROPH/DIAG INJ IV PUSH: CPT

## 2025-05-24 PROCEDURE — 96361 HYDRATE IV INFUSION ADD-ON: CPT

## 2025-05-24 PROCEDURE — 99283 EMERGENCY DEPT VISIT LOW MDM: CPT

## 2025-05-24 PROCEDURE — 96375 TX/PRO/DX INJ NEW DRUG ADDON: CPT

## 2025-05-24 RX ORDER — KETOROLAC TROMETHAMINE 30 MG/ML
15 INJECTION, SOLUTION INTRAMUSCULAR; INTRAVENOUS ONCE
Status: COMPLETED | OUTPATIENT
Start: 2025-05-24 | End: 2025-05-24

## 2025-05-24 RX ORDER — METOCLOPRAMIDE HYDROCHLORIDE 5 MG/ML
10 INJECTION INTRAMUSCULAR; INTRAVENOUS ONCE
Status: COMPLETED | OUTPATIENT
Start: 2025-05-24 | End: 2025-05-24

## 2025-05-24 RX ORDER — DIPHENHYDRAMINE HYDROCHLORIDE 50 MG/ML
25 INJECTION, SOLUTION INTRAMUSCULAR; INTRAVENOUS ONCE
Status: COMPLETED | OUTPATIENT
Start: 2025-05-24 | End: 2025-05-24

## 2025-05-24 RX ADMIN — SODIUM CHLORIDE 500 ML: 0.9 INJECTION, SOLUTION INTRAVENOUS at 07:37

## 2025-05-24 RX ADMIN — KETOROLAC TROMETHAMINE 15 MG: 30 INJECTION, SOLUTION INTRAMUSCULAR at 07:50

## 2025-05-24 RX ADMIN — METOCLOPRAMIDE 10 MG: 5 INJECTION, SOLUTION INTRAMUSCULAR; INTRAVENOUS at 07:40

## 2025-05-24 RX ADMIN — DIPHENHYDRAMINE HYDROCHLORIDE 25 MG: 50 INJECTION, SOLUTION INTRAMUSCULAR; INTRAVENOUS at 07:40

## 2025-05-24 NOTE — DISCHARGE INSTRUCTIONS
Follow-up with your PMD.  Follow-up with your neurologist.  If you have headache again down to the later take the medication and try to stay in other dark quiet room.

## 2025-05-24 NOTE — ED PROVIDER NOTES
Time reflects when diagnosis was documented in both MDM as applicable and the Disposition within this note       Time User Action Codes Description Comment    5/24/2025  9:46 AM NerissaNandini watson Add [G43.809] Other migraine without status migrainosus, not intractable           ED Disposition       ED Disposition   Discharge    Condition   Stable    Date/Time   Sat May 24, 2025  9:46 AM    Comment   Dawit Mckeon discharge to home/self care.                   Assessment & Plan       Medical Decision Making  This is a 40 years old came back as patient was at the ER 3 days ago, and she was tested positive for COVID-19.  Patient came today complaining of migraine headache and she has it for 3 days.  Patient took NSAID and sumatriptan at home yesterday but does not help her.  Patient has nausea no vomiting.  Patient denies any blurred vision.  Patient has similar headache in the past.  Patient states that she cannot tolerate denies of the light.  Patient has no medical history takes no medications.  Physical exam shows no pertinent positive findings.  Neurological exam no focal deficits.  Patient received IV fluids, Toradol, Reglan, Benadryl.  Patient felt better and she wants to go home.  Case discussed with the patient and her  and told her to need to follow-up with her neurologist.  Differential diagnoses include but not limited to migraine headache, tension headache, cluster headache, hemicrania, depression, COVID-19 with viral syndrome.    Risk  Prescription drug management.             Medications   sodium chloride 0.9 % bolus 500 mL (0 mL Intravenous Stopped 5/24/25 0937)   ketorolac (TORADOL) injection 15 mg (15 mg Intravenous Given 5/24/25 0750)   metoclopramide (REGLAN) injection 10 mg (10 mg Intravenous Given 5/24/25 0740)   diphenhydrAMINE (BENADRYL) injection 25 mg (25 mg Intravenous Given 5/24/25 0740)       ED Risk Strat Scores                    No data recorded                            History  of Present Illness       Chief Complaint   Patient presents with    Headache     Pt presents to the ED c/o of a HA for 2 days, pt was recently dx with COVID       Past Medical History[1]   Past Surgical History[2]   Family History[3]   Social History[4]   E-Cigarette/Vaping    E-Cigarette Use Never User       E-Cigarette/Vaping Substances    Nicotine No     THC No     CBD No     Flavoring No     Other No     Unknown No       I have reviewed and agree with the history as documented.     This is a 40 years old came for having migraine headache.  Patient was at the ER 3 days ago and found  she had COVID-19.  Patient at that time had fever, generalized body ache, and headache.  Patient has history of migraine headache and she stated most of the headache is on the top of the head and on the temporal area and  the back of the head.  Patient took Motrin at home and sumatriptan yesterday but does not help her.  Patient stated she has this headache for 3 days.  Patient has similar headache in the past.  Patient stated it is not the worst headache of her life.  Patient denies any fever, neck pain, neck stiffness.  Patient denies medical history and surgical history.  Patient stated she cannot tolerate the light of the noise.  Patient is nauseous but she did not vomit.  Patient denies blurred vision.  Patient denies any numbness or tingling of the face or extremities.      History provided by:  Patient   used: No    Headache  Pain location:  Generalized  Quality:  Dull  Radiates to:  Does not radiate  Severity currently:  8/10  Severity at highest:  8/10  Duration:  3 days  Progression:  Waxing and waning  Chronicity:  Recurrent  Worsened by:  Light and sound  Ineffective treatments:  NSAIDs and prescription medications  Associated symptoms: no abdominal pain, no back pain, no congestion, no cough, no ear pain, no eye pain, no fever, no seizures, no sore throat and no vomiting        Review of Systems    Constitutional:  Negative for chills and fever.   HENT:  Negative for congestion, ear pain and sore throat.    Eyes:  Negative for pain and visual disturbance.   Respiratory:  Negative for cough and shortness of breath.    Cardiovascular:  Negative for chest pain and palpitations.   Gastrointestinal:  Negative for abdominal pain and vomiting.   Genitourinary:  Negative for dysuria and hematuria.   Musculoskeletal:  Negative for arthralgias and back pain.   Skin:  Negative for color change and rash.   Neurological:  Positive for headaches. Negative for seizures and syncope.   All other systems reviewed and are negative.          Objective       ED Triage Vitals   Temperature Pulse Blood Pressure Respirations SpO2 Patient Position - Orthostatic VS   05/24/25 0639 05/24/25 0639 05/24/25 0639 05/24/25 0639 05/24/25 0639 05/24/25 0639   98.3 °F (36.8 °C) 61 129/91 16 98 % Sitting      Temp Source Heart Rate Source BP Location FiO2 (%) Pain Score    05/24/25 0639 05/24/25 0639 05/24/25 0639 -- 05/24/25 0750    Oral Monitor Left arm  7      Vitals      Date and Time Temp Pulse SpO2 Resp BP Pain Score FACES Pain Rating User   05/24/25 0915 -- 57 97 % -- -- -- --    05/24/25 0800 -- 60 99 % -- -- -- --    05/24/25 0750 -- -- -- -- -- 7 --    05/24/25 0639 98.3 °F (36.8 °C) 61 98 % 16 129/91 -- -- BM            Physical Exam  Vitals and nursing note reviewed.   Constitutional:       General: She is not in acute distress.     Appearance: Normal appearance. She is well-developed. She is not ill-appearing, toxic-appearing or diaphoretic.   HENT:      Head: Normocephalic and atraumatic.      Right Ear: Tympanic membrane and ear canal normal.      Left Ear: Tympanic membrane and ear canal normal.      Nose: Nose normal. No congestion or rhinorrhea.      Mouth/Throat:      Mouth: Mucous membranes are moist.      Pharynx: No oropharyngeal exudate or posterior oropharyngeal erythema.     Eyes:      Extraocular Movements:  Extraocular movements intact.      Conjunctiva/sclera: Conjunctivae normal.      Pupils: Pupils are equal, round, and reactive to light.     Neck:      Vascular: No carotid bruit.     Cardiovascular:      Rate and Rhythm: Normal rate and regular rhythm.      Heart sounds: No murmur heard.     No friction rub. No gallop.   Pulmonary:      Effort: Pulmonary effort is normal. No respiratory distress.      Breath sounds: Normal breath sounds. No wheezing, rhonchi or rales.   Chest:      Chest wall: No tenderness.   Abdominal:      General: There is no distension.      Palpations: Abdomen is soft. There is no mass.      Tenderness: There is no abdominal tenderness. There is no guarding or rebound.      Hernia: No hernia is present.     Musculoskeletal:         General: No swelling, tenderness, deformity or signs of injury.      Cervical back: Normal range of motion and neck supple. No rigidity or tenderness.      Right lower leg: No edema.      Left lower leg: No edema.   Lymphadenopathy:      Cervical: No cervical adenopathy.     Skin:     General: Skin is warm and dry.      Capillary Refill: Capillary refill takes less than 2 seconds.      Coloration: Skin is not jaundiced or pale.      Findings: No bruising, erythema, lesion or rash.     Neurological:      General: No focal deficit present.      Mental Status: She is alert and oriented to person, place, and time.      Cranial Nerves: No cranial nerve deficit.      Sensory: No sensory deficit.      Motor: No weakness.      Coordination: Coordination normal.     Psychiatric:         Mood and Affect: Mood normal.         Results Reviewed       None            No orders to display       Procedures    ED Medication and Procedure Management   Prior to Admission Medications   Prescriptions Last Dose Informant Patient Reported? Taking?   Cholecalciferol (VITAMIN D3) 1,000 units tablet   Yes No   Sig: Take 1,000 Units by mouth daily   SUMAtriptan (Imitrex) 100 mg tablet   No  No   Sig: Use 100mg at onset of migraine and  may repeat x 1 only in 2 hours. No more than 2 tablets in 24 hours   medroxyPROGESTERone (PROVERA) 10 mg tablet   No No   Sig: Take 1 tablet (10 mg total) by mouth daily   propranolol (INDERAL LA) 80 mg 24 hr capsule   No No   Sig: Take 1 capsule (80 mg total) by mouth daily at bedtime   vitamin B-12 (CYANOCOBALAMIN) 100 MCG tablet   Yes No   Sig: Take 100 mcg by mouth daily      Facility-Administered Medications: None     Discharge Medication List as of 5/24/2025  9:47 AM        CONTINUE these medications which have NOT CHANGED    Details   Cholecalciferol (VITAMIN D3) 1,000 units tablet Take 1,000 Units by mouth daily, Historical Med      medroxyPROGESTERone (PROVERA) 10 mg tablet Take 1 tablet (10 mg total) by mouth daily, Starting Mon 3/10/2025, Normal      propranolol (INDERAL LA) 80 mg 24 hr capsule Take 1 capsule (80 mg total) by mouth daily at bedtime, Starting Wed 2/12/2025, Normal      SUMAtriptan (Imitrex) 100 mg tablet Use 100mg at onset of migraine and  may repeat x 1 only in 2 hours. No more than 2 tablets in 24 hours, Normal      vitamin B-12 (CYANOCOBALAMIN) 100 MCG tablet Take 100 mcg by mouth daily, Historical Med           No discharge procedures on file.  ED SEPSIS DOCUMENTATION   Time reflects when diagnosis was documented in both MDM as applicable and the Disposition within this note       Time User Action Codes Description Comment    5/24/2025  9:46 AM Nandini Quinonez Add [G43.809] Other migraine without status migrainosus, not intractable                      [1]   Past Medical History:  Diagnosis Date    Migraines    [2]   Past Surgical History:  Procedure Laterality Date    NO PAST SURGERIES      NC EXC B9 LESION MRGN XCP SK TG T/A/L 1.1-2.0 CM N/A 4/10/2025    Procedure: EXCISION  BIOPSY LESION/MASS BACK x2;  Surgeon: Brodie Valdez MD;  Location: [facility];  Service: General    NC EXC B9 LESION MRGN XCP SK TG T/A/L 3.1-4.0 CM N/A 4/10/2025     Procedure: EXCISION  BIOPSY LESION/MASS BACK x2;  Surgeon: Brodie Valdez MD;  Location: [facility];  Service: General    CT EXC B9 LESION MRGN XCP SK TG T/A/L 3.1-4.0 CM Left 4/10/2025    Procedure: EXCISION BIOPSY TISSUE LESION/MASS THIGH/ LOWER EXTREMITY;  Surgeon: Brodie Valdez MD;  Location: [facility];  Service: General   [3]   Family History  Problem Relation Name Age of Onset    Diabetes Mother      Diabetes Father     [4]   Social History  Tobacco Use    Smoking status: Never    Smokeless tobacco: Never   Vaping Use    Vaping status: Never Used   Substance Use Topics    Alcohol use: Never    Drug use: Never        Nandini Quinonez MD  05/24/25 6698

## 2025-05-25 LAB
BACTERIA BLD CULT: NORMAL
BACTERIA BLD CULT: NORMAL

## 2025-05-26 LAB
BACTERIA BLD CULT: NORMAL
BACTERIA BLD CULT: NORMAL

## 2025-05-27 RX ORDER — PROPRANOLOL HYDROCHLORIDE 80 MG/1
80 CAPSULE, EXTENDED RELEASE ORAL
Qty: 30 CAPSULE | Refills: 2 | Status: SHIPPED | OUTPATIENT
Start: 2025-05-27

## 2025-05-27 RX ORDER — SUMATRIPTAN SUCCINATE 100 MG/1
TABLET ORAL
Qty: 10 TABLET | Refills: 0 | Status: SHIPPED | OUTPATIENT
Start: 2025-05-27

## 2025-06-05 RX ORDER — MEDROXYPROGESTERONE ACETATE 10 MG
10 TABLET ORAL DAILY
Qty: 5 TABLET | Refills: 0 | Status: SHIPPED | OUTPATIENT
Start: 2025-06-05

## 2025-06-21 DIAGNOSIS — G43.709 CHRONIC MIGRAINE WITHOUT AURA WITHOUT STATUS MIGRAINOSUS, NOT INTRACTABLE: ICD-10-CM

## 2025-06-23 RX ORDER — SUMATRIPTAN SUCCINATE 100 MG/1
TABLET ORAL
Qty: 9 TABLET | Refills: 1 | Status: SHIPPED | OUTPATIENT
Start: 2025-06-23

## 2025-07-30 ENCOUNTER — TELEPHONE (OUTPATIENT)
Dept: NEUROSURGERY | Facility: CLINIC | Age: 40
End: 2025-07-30

## 2025-08-04 ENCOUNTER — TELEPHONE (OUTPATIENT)
Dept: NEUROSURGERY | Facility: CLINIC | Age: 40
End: 2025-08-04

## 2025-08-06 ENCOUNTER — TELEPHONE (OUTPATIENT)
Dept: NEUROLOGY | Facility: CLINIC | Age: 40
End: 2025-08-06

## 2025-08-13 ENCOUNTER — OFFICE VISIT (OUTPATIENT)
Dept: NEUROLOGY | Facility: CLINIC | Age: 40
End: 2025-08-13
Payer: COMMERCIAL

## 2025-08-13 PROBLEM — G43.709 CHRONIC MIGRAINE WITHOUT AURA WITHOUT STATUS MIGRAINOSUS, NOT INTRACTABLE: Status: ACTIVE | Noted: 2025-08-13

## 2025-08-15 ENCOUNTER — NURSE TRIAGE (OUTPATIENT)
Age: 40
End: 2025-08-15

## (undated) DEVICE — DRAPE TOWEL: Brand: CONVERTORS

## (undated) DEVICE — SUT VICRYL 2-0 SH 27 IN UNDYED J417H

## (undated) DEVICE — GLOVE INDICATOR PI UNDERGLOVE SZ 8 BLUE

## (undated) DEVICE — GLOVE SRG BIOGEL ECLIPSE 7.5

## (undated) DEVICE — BETHLEHEM UNIVERSAL MINOR GEN: Brand: CARDINAL HEALTH

## (undated) DEVICE — SUT MONOCRYL 4-0 PS-2 18 IN Y496G

## (undated) DEVICE — TIBURON SPLIT SHEET: Brand: CONVERTORS

## (undated) DEVICE — PAD GROUNDING DUAL ADULT

## (undated) DEVICE — INTENDED FOR TISSUE SEPARATION, AND OTHER PROCEDURES THAT REQUIRE A SHARP SURGICAL BLADE TO PUNCTURE OR CUT.: Brand: BARD-PARKER SAFETY BLADES SIZE 15, STERILE

## (undated) DEVICE — CHLORAPREP HI-LITE 26ML ORANGE

## (undated) DEVICE — PLUMEPEN PRO 10FT

## (undated) DEVICE — MEDI-VAC YANK SUCT HNDL W/TPRD BULBOUS TIP: Brand: CARDINAL HEALTH

## (undated) DEVICE — NEEDLE 25G X 1 1/2